# Patient Record
Sex: FEMALE | Race: WHITE | Employment: FULL TIME | URBAN - NONMETROPOLITAN AREA
[De-identification: names, ages, dates, MRNs, and addresses within clinical notes are randomized per-mention and may not be internally consistent; named-entity substitution may affect disease eponyms.]

---

## 2018-01-22 ENCOUNTER — HOSPITAL ENCOUNTER (OUTPATIENT)
Dept: GENERAL RADIOLOGY | Age: 38
Discharge: HOME | End: 2018-01-22
Payer: COMMERCIAL

## 2018-01-22 ENCOUNTER — HOSPITAL ENCOUNTER (OUTPATIENT)
Age: 38
Setting detail: SPECIMEN
Discharge: HOME | End: 2018-01-22
Payer: COMMERCIAL

## 2018-01-22 ENCOUNTER — HOSPITAL ENCOUNTER (OUTPATIENT)
Age: 38
Discharge: HOME | End: 2018-01-22
Payer: COMMERCIAL

## 2018-01-22 ENCOUNTER — OFFICE VISIT (OUTPATIENT)
Dept: PRIMARY CARE CLINIC | Age: 38
End: 2018-01-22
Payer: COMMERCIAL

## 2018-01-22 VITALS
HEIGHT: 62 IN | HEART RATE: 81 BPM | DIASTOLIC BLOOD PRESSURE: 88 MMHG | OXYGEN SATURATION: 98 % | BODY MASS INDEX: 34.19 KG/M2 | WEIGHT: 185.8 LBS | RESPIRATION RATE: 18 BRPM | SYSTOLIC BLOOD PRESSURE: 130 MMHG | TEMPERATURE: 99.8 F

## 2018-01-22 DIAGNOSIS — M70.50 BURSITIS OF KNEE, UNSPECIFIED BURSA, UNSPECIFIED LATERALITY: ICD-10-CM

## 2018-01-22 DIAGNOSIS — M25.562 PAIN AND SWELLING OF LEFT KNEE: Primary | ICD-10-CM

## 2018-01-22 DIAGNOSIS — R50.9 FEVER, UNSPECIFIED FEVER CAUSE: ICD-10-CM

## 2018-01-22 DIAGNOSIS — M25.462 PAIN AND SWELLING OF LEFT KNEE: ICD-10-CM

## 2018-01-22 DIAGNOSIS — M25.462 PAIN AND SWELLING OF LEFT KNEE: Primary | ICD-10-CM

## 2018-01-22 DIAGNOSIS — M25.562 PAIN AND SWELLING OF LEFT KNEE: ICD-10-CM

## 2018-01-22 LAB
ABSOLUTE EOS #: 0.3 K/UL (ref 0–0.4)
ABSOLUTE IMMATURE GRANULOCYTE: NORMAL K/UL (ref 0–0.3)
ABSOLUTE LYMPH #: 3.2 K/UL (ref 1–4.8)
ABSOLUTE MONO #: 0.7 K/UL (ref 0.1–1.2)
BASOPHILS # BLD: 1 % (ref 0–1)
BASOPHILS ABSOLUTE: 0.1 K/UL (ref 0–0.2)
DIFFERENTIAL TYPE: NORMAL
EOSINOPHILS RELATIVE PERCENT: 3 % (ref 1–7)
HCT VFR BLD CALC: 39.8 % (ref 36–46)
HEMOGLOBIN: 13.6 G/DL (ref 12–16)
IMMATURE GRANULOCYTES: NORMAL %
LYMPHOCYTES # BLD: 36 % (ref 16–46)
MCH RBC QN AUTO: 29.6 PG (ref 26–34)
MCHC RBC AUTO-ENTMCNC: 34.2 G/DL (ref 31–37)
MCV RBC AUTO: 86.5 FL (ref 80–100)
MONOCYTES # BLD: 7 % (ref 4–11)
NRBC AUTOMATED: NORMAL PER 100 WBC
PDW BLD-RTO: 14.3 % (ref 11–14.5)
PLATELET # BLD: 235 K/UL (ref 140–450)
PLATELET ESTIMATE: NORMAL
PMV BLD AUTO: 9.8 FL (ref 6–12)
RBC # BLD: 4.6 M/UL (ref 4–5.2)
RBC # BLD: NORMAL 10*6/UL
SEDIMENTATION RATE, ERYTHROCYTE: 5 MM (ref 0–20)
SEG NEUTROPHILS: 53 % (ref 43–77)
SEGMENTED NEUTROPHILS ABSOLUTE COUNT: 4.7 K/UL (ref 1.8–7.7)
URIC ACID: 5.3 MG/DL (ref 2.4–5.7)
WBC # BLD: 8.9 K/UL (ref 3.5–11)
WBC # BLD: NORMAL 10*3/UL

## 2018-01-22 PROCEDURE — 73562 X-RAY EXAM OF KNEE 3: CPT

## 2018-01-22 PROCEDURE — 85025 COMPLETE CBC W/AUTO DIFF WBC: CPT

## 2018-01-22 PROCEDURE — 4004F PT TOBACCO SCREEN RCVD TLK: CPT | Performed by: NURSE PRACTITIONER

## 2018-01-22 PROCEDURE — 99203 OFFICE O/P NEW LOW 30 MIN: CPT | Performed by: NURSE PRACTITIONER

## 2018-01-22 PROCEDURE — G8417 CALC BMI ABV UP PARAM F/U: HCPCS | Performed by: NURSE PRACTITIONER

## 2018-01-22 PROCEDURE — 36415 COLL VENOUS BLD VENIPUNCTURE: CPT

## 2018-01-22 PROCEDURE — A6449 LT COMPRES BAND >=3" <5"/YD: HCPCS | Performed by: NURSE PRACTITIONER

## 2018-01-22 PROCEDURE — 85651 RBC SED RATE NONAUTOMATED: CPT

## 2018-01-22 PROCEDURE — 84550 ASSAY OF BLOOD/URIC ACID: CPT

## 2018-01-22 PROCEDURE — G8427 DOCREV CUR MEDS BY ELIG CLIN: HCPCS | Performed by: NURSE PRACTITIONER

## 2018-01-22 PROCEDURE — G8484 FLU IMMUNIZE NO ADMIN: HCPCS | Performed by: NURSE PRACTITIONER

## 2018-01-22 RX ORDER — BUPROPION HYDROCHLORIDE 150 MG/1
TABLET ORAL
COMMUNITY
Start: 2017-12-16 | End: 2019-07-02

## 2018-01-22 RX ORDER — FERROUS SULFATE 325(65) MG
TABLET ORAL
COMMUNITY
Start: 2017-12-16 | End: 2019-07-02

## 2018-01-22 RX ORDER — PREDNISONE 20 MG/1
TABLET ORAL
Qty: 15 TABLET | Refills: 0 | Status: SHIPPED | OUTPATIENT
Start: 2018-01-22 | End: 2019-07-02 | Stop reason: ALTCHOICE

## 2018-01-22 RX ORDER — LEVOTHYROXINE SODIUM 0.1 MG/1
TABLET ORAL
COMMUNITY
Start: 2018-01-20 | End: 2019-07-02

## 2018-01-22 ASSESSMENT — ENCOUNTER SYMPTOMS: RESPIRATORY NEGATIVE: 1

## 2018-01-22 NOTE — PROGRESS NOTES
of left knee TECHNOLOGIST PROVIDED HISTORY: Reason for exam:->Bilateral left knee pain to medial and lateral aspect. Prominent swelling noted. No known injury. Limited ROM. Pain intensifies with standing. Ordering Physician Provided Reason for Exam: Pain anterior knee for 1 week, no known injury, swelling Acuity: Acute Type of Exam: Initial FINDINGS: No evidence of acute fracture or dislocation. No focal osseous lesion. Slight lateral subluxation of the patella with respect to the femur. Mild to moderate joint effusion. No focal soft tissue abnormality. 1. No acute osseous abnormality. 2. Mild to moderate joint effusion. 3. Slight lateral subluxation of the patella with respect to the femur, please correlate for symptoms of patellar instability.      Hospital Outpatient Visit on 01/22/2018   Component Date Value Ref Range Status    WBC 01/22/2018 8.9  3.5 - 11.0 k/uL Final    RBC 01/22/2018 4.60  4.0 - 5.2 m/uL Final    Hemoglobin 01/22/2018 13.6  12.0 - 16.0 g/dL Final    Hematocrit 01/22/2018 39.8  36 - 46 % Final    MCV 01/22/2018 86.5  80 - 100 fL Final    MCH 01/22/2018 29.6  26 - 34 pg Final    MCHC 01/22/2018 34.2  31 - 37 g/dL Final    RDW 01/22/2018 14.3  11.0 - 14.5 % Final    Platelets 00/87/3334 235  140 - 450 k/uL Final    MPV 01/22/2018 9.8  6.0 - 12.0 fL Final    NRBC Automated 01/22/2018 NOT REPORTED  per 100 WBC Final    Differential Type 01/22/2018 NOT REPORTED   Final    Immature Granulocytes 01/22/2018 NOT REPORTED  0 % Final    Absolute Immature Granulocyte 01/22/2018 NOT REPORTED  0.00 - 0.30 k/uL Final    WBC Morphology 01/22/2018 NOT REPORTED   Final    RBC Morphology 01/22/2018 NOT REPORTED   Final    Platelet Estimate 91/25/8682 NOT REPORTED   Final    Seg Neutrophils 01/22/2018 53  43 - 77 % Final    Lymphocytes 01/22/2018 36  16 - 46 % Final    Monocytes 01/22/2018 7  4 - 11 % Final    Eosinophils % 01/22/2018 3  1 - 7 % Final    Basophils 01/22/2018 1  0 - 1 and then daily x 5 days. 15 tablet 0     No current facility-administered medications for this visit.

## 2018-01-23 NOTE — PATIENT INSTRUCTIONS
you can put on your leg. Use a cane, crutches, or a walker as instructed. · Follow your doctor's instructions about activity during your healing process. If you can do mild exercise, slowly increase your activity. · Reach and stay at a healthy weight. Extra weight can strain the joints, especially the knees and hips, and make the pain worse. Losing even a few pounds may help. When should you call for help? Call 911 anytime you think you may need emergency care. For example, call if:  ? · You have symptoms of a blood clot in your lung (called a pulmonary embolism). These may include:  ¨ Sudden chest pain. ¨ Trouble breathing. ¨ Coughing up blood. ?Call your doctor now or seek immediate medical care if:  ? · You have severe or increasing pain. ? · Your leg or foot turns cold or changes color. ? · You cannot stand or put weight on your knee. ? · Your knee looks twisted or bent out of shape. ? · You cannot move your knee. ? · You have signs of infection, such as:  ¨ Increased pain, swelling, warmth, or redness. ¨ Red streaks leading from the knee. ¨ Pus draining from a place on your knee. ¨ A fever. ? · You have signs of a blood clot in your leg (called a deep vein thrombosis), such as:  ¨ Pain in your calf, back of the knee, thigh, or groin. ¨ Redness and swelling in your leg or groin. ? Watch closely for changes in your health, and be sure to contact your doctor if:  ? · You have tingling, weakness, or numbness in your knee. ? · You have any new symptoms, such as swelling. ? · You have bruises from a knee injury that last longer than 2 weeks. ? · You do not get better as expected. Where can you learn more? Go to https://[employer]. org and sign in to your [employer] account. Enter K195 in the Bag of Ice box to learn more about \"Knee Pain or Injury: Care Instructions. \"     If you do not have an account, please click on the \"Sign Up Now\" link.   Current as of: prescription medicine for pain, take it as prescribed. ¨ If you are not taking a prescription pain medicine, ask your doctor if you can take an over-the-counter medicine. · Eat less seafood and red meat. · Check with your doctor before drinking alcohol. · Losing weight, if you are overweight, may help reduce attacks of gout. But do not go on a [employer]. \" Losing a lot of weight in a short amount of time can cause a gout attack. When should you call for help? Call your doctor now or seek immediate medical care if:  ? · You have a fever. ? · The joint is so painful you cannot use it. ? · You have sudden, unexplained swelling, redness, warmth, or severe pain in one or more joints. ? Watch closely for changes in your health, and be sure to contact your doctor if:  ? · You have joint pain. ? · Your symptoms get worse or are not improving after 2 or 3 days. Where can you learn more? Go to https://[employer].Friendsee. org and sign in to your [employer] account. Enter M989 in the [employer] box to learn more about \"Gout: Care Instructions. \"     If you do not have an account, please click on the \"Sign Up Now\" link. Current as of: October 31, 2016  Content Version: 11.5  © 9163-2660 [employer]. Care instructions adapted under license by [facility] ([facility]). If you have questions about a medical condition or this instruction, always ask your healthcare professional. [place] [address] any warranty or liability for your use of this information. Patient Education        Septic Arthritis: Care Instructions  Your Care Instructions    Septic arthritis is a bacterial infection in a joint. This occurs when an infection from another part of the body, such as pneumonia or a skin or kidney infection, travels through the bloodstream to the joint. It may also spread to the joint from an infection in nearby soft tissue, or it can follow a surgery or injury.  The joint is instructions adapted under license by [facility] ([facility]). If you have questions about a medical condition or this instruction, always ask your healthcare professional. [address] any warranty or liability for your use of this information.

## 2019-03-25 ENCOUNTER — OFFICE VISIT (OUTPATIENT)
Dept: PRIMARY CARE CLINIC | Age: 39
End: 2019-03-25
Payer: COMMERCIAL

## 2019-03-25 VITALS
RESPIRATION RATE: 16 BRPM | DIASTOLIC BLOOD PRESSURE: 80 MMHG | BODY MASS INDEX: 33.75 KG/M2 | WEIGHT: 183.4 LBS | OXYGEN SATURATION: 98 % | TEMPERATURE: 99.4 F | HEART RATE: 75 BPM | HEIGHT: 62 IN | SYSTOLIC BLOOD PRESSURE: 130 MMHG

## 2019-03-25 DIAGNOSIS — S46.811A STRAIN OF RIGHT TRAPEZIUS MUSCLE, INITIAL ENCOUNTER: Primary | ICD-10-CM

## 2019-03-25 PROCEDURE — G8484 FLU IMMUNIZE NO ADMIN: HCPCS | Performed by: NURSE PRACTITIONER

## 2019-03-25 PROCEDURE — G8427 DOCREV CUR MEDS BY ELIG CLIN: HCPCS | Performed by: NURSE PRACTITIONER

## 2019-03-25 PROCEDURE — 99213 OFFICE O/P EST LOW 20 MIN: CPT | Performed by: NURSE PRACTITIONER

## 2019-03-25 PROCEDURE — G8417 CALC BMI ABV UP PARAM F/U: HCPCS | Performed by: NURSE PRACTITIONER

## 2019-03-25 PROCEDURE — 4004F PT TOBACCO SCREEN RCVD TLK: CPT | Performed by: NURSE PRACTITIONER

## 2019-03-25 RX ORDER — CYCLOBENZAPRINE HCL 10 MG
10 TABLET ORAL NIGHTLY PRN
Qty: 30 TABLET | Refills: 0 | Status: SHIPPED | OUTPATIENT
Start: 2019-03-25 | End: 2019-04-04

## 2019-03-25 RX ORDER — PREDNISONE 10 MG/1
TABLET ORAL
Qty: 32 TABLET | Refills: 0 | Status: SHIPPED | OUTPATIENT
Start: 2019-03-25 | End: 2019-07-02

## 2019-03-25 ASSESSMENT — PATIENT HEALTH QUESTIONNAIRE - PHQ9
2. FEELING DOWN, DEPRESSED OR HOPELESS: 0
SUM OF ALL RESPONSES TO PHQ QUESTIONS 1-9: 0
1. LITTLE INTEREST OR PLEASURE IN DOING THINGS: 0
SUM OF ALL RESPONSES TO PHQ QUESTIONS 1-9: 0
SUM OF ALL RESPONSES TO PHQ9 QUESTIONS 1 & 2: 0

## 2019-03-25 ASSESSMENT — ENCOUNTER SYMPTOMS
TROUBLE SWALLOWING: 0
WHEEZING: 0
COUGH: 0

## 2019-07-02 ENCOUNTER — OFFICE VISIT (OUTPATIENT)
Dept: FAMILY MEDICINE CLINIC | Age: 39
End: 2019-07-02
Payer: COMMERCIAL

## 2019-07-02 VITALS
SYSTOLIC BLOOD PRESSURE: 132 MMHG | HEART RATE: 64 BPM | OXYGEN SATURATION: 97 % | BODY MASS INDEX: 32.12 KG/M2 | WEIGHT: 181.3 LBS | DIASTOLIC BLOOD PRESSURE: 86 MMHG | HEIGHT: 63 IN

## 2019-07-02 DIAGNOSIS — R53.83 FATIGUE, UNSPECIFIED TYPE: ICD-10-CM

## 2019-07-02 DIAGNOSIS — E03.9 HYPOTHYROIDISM, UNSPECIFIED TYPE: ICD-10-CM

## 2019-07-02 DIAGNOSIS — F41.9 ANXIETY: ICD-10-CM

## 2019-07-02 DIAGNOSIS — Z23 NEED FOR PNEUMOCOCCAL VACCINATION: ICD-10-CM

## 2019-07-02 DIAGNOSIS — Z13.220 SCREENING FOR HYPERLIPIDEMIA: ICD-10-CM

## 2019-07-02 DIAGNOSIS — Z23 NEED FOR TDAP VACCINATION: ICD-10-CM

## 2019-07-02 DIAGNOSIS — Z76.89 ENCOUNTER TO ESTABLISH CARE: Primary | ICD-10-CM

## 2019-07-02 DIAGNOSIS — J30.2 SEASONAL ALLERGIC RHINITIS, UNSPECIFIED TRIGGER: ICD-10-CM

## 2019-07-02 DIAGNOSIS — Z86.2 HX OF IRON DEFICIENCY ANEMIA: ICD-10-CM

## 2019-07-02 PROCEDURE — 4004F PT TOBACCO SCREEN RCVD TLK: CPT | Performed by: NURSE PRACTITIONER

## 2019-07-02 PROCEDURE — 90471 IMMUNIZATION ADMIN: CPT | Performed by: NURSE PRACTITIONER

## 2019-07-02 PROCEDURE — 99204 OFFICE O/P NEW MOD 45 MIN: CPT | Performed by: NURSE PRACTITIONER

## 2019-07-02 PROCEDURE — 90732 PPSV23 VACC 2 YRS+ SUBQ/IM: CPT | Performed by: NURSE PRACTITIONER

## 2019-07-02 PROCEDURE — 90715 TDAP VACCINE 7 YRS/> IM: CPT | Performed by: NURSE PRACTITIONER

## 2019-07-02 PROCEDURE — 90472 IMMUNIZATION ADMIN EACH ADD: CPT | Performed by: NURSE PRACTITIONER

## 2019-07-02 PROCEDURE — G8427 DOCREV CUR MEDS BY ELIG CLIN: HCPCS | Performed by: NURSE PRACTITIONER

## 2019-07-02 PROCEDURE — G8417 CALC BMI ABV UP PARAM F/U: HCPCS | Performed by: NURSE PRACTITIONER

## 2019-07-02 RX ORDER — LORATADINE 10 MG/1
10 TABLET ORAL DAILY
Qty: 30 TABLET | Refills: 5 | Status: SHIPPED | OUTPATIENT
Start: 2019-07-02

## 2019-07-02 RX ORDER — EUCALYPTUS/PEPPERMINT OIL
1 SOLUTION, NON-ORAL NASAL 2 TIMES DAILY
Qty: 1 BOTTLE | COMMUNITY
Start: 2019-07-02

## 2019-07-02 ASSESSMENT — ENCOUNTER SYMPTOMS
COUGH: 0
WHEEZING: 0
SORE THROAT: 1
SINUS PRESSURE: 1
ABDOMINAL PAIN: 0
DIARRHEA: 0
RHINORRHEA: 1
EYE ITCHING: 1
SHORTNESS OF BREATH: 0
CONSTIPATION: 0

## 2019-07-02 NOTE — PROGRESS NOTES
trigger    6. Need for Tdap vaccination    7. Hx of iron deficiency anemia    8. Screening for hyperlipidemia    9. Need for pneumococcal vaccination        PLAN:    Orders Placed This Encounter   Procedures    Tdap (age 6y and older) IM (Boostrix)    Pneumococcal polysaccharide vaccine 23-valent greater than or equal to 3yo subcutaneous/IM    TSH without Reflex     Standing Status:   Future     Standing Expiration Date:   7/2/2020    CBC Auto Differential     Standing Status:   Future     Standing Expiration Date:   8/31/2019    Comprehensive Metabolic Panel     Standing Status:   Future     Standing Expiration Date:   7/1/2020    Lipid, Fasting     Standing Status:   Future     Standing Expiration Date:   7/1/2020    CBC Auto Differential    Lipid Panel    Comprehensive Metabolic Panel    TSH without Reflex     Went to counseling years ago, does not feel she needs additional help with anxiety at this time. Patient given educational materials - see patient instructions. Encouraged monthly self breast exams, healthy diet and routine exercise. Instructed to continue current medications. All patient questions answered. Pt voiced understanding. Health Maintenance reviewed- patient asked to schedule her pap smear. Patient agreed with treatment plan. Follow up as directed. Return in about 1 month (around 7/30/2019) for Pap.     Electronically signed by JOCELINE Mota CNP on 7/7/2019 at 4:05 PM.

## 2019-07-03 LAB
A/G RATIO: 1.8 RATIO
AGE FOR GFR: 37
ALBUMIN: 4.9 G/DL (ref 3.5–5)
ALK PHOSPHATASE: 38 UNITS/L (ref 38–126)
ALT SERPL-CCNC: 192 UNITS/L (ref 9–52)
ANION GAP SERPL CALCULATED.3IONS-SCNC: 15 MMOL/L
AST SERPL-CCNC: 152 UNITS/L (ref 14–36)
BASOPHILS # BLD: 0.14 THOU/MM3 (ref 0–0.3)
BILIRUB SERPL-MCNC: 0.7 MG/DL (ref 0.2–1.3)
BUN BLDV-MCNC: 9 MG/DL (ref 7–17)
CALCIUM SERPL-MCNC: 9.6 MG/DL (ref 8.4–10.2)
CHLORIDE BLD-SCNC: 99 MMOL/L (ref 98–120)
CHOLESTEROL/HDL RATIO: 5.9 RATIO (ref 0–4.5)
CHOLESTEROL: 206 MG/DL (ref 50–200)
CO2: 28 MMOL/L (ref 22–31)
CREAT SERPL-MCNC: 0.6 MG/DL (ref 0.5–1)
DIFFERENTIAL: AUTOMATED DIFF
EGFR BF: 136 ML/MIN/1.73 M2
EGFR BM: 184 ML/MIN/1.73 M2
EGFR WF: 112 ML/MIN/1.73 M2
EGFR WM: 152 ML/MIN/1.73 M2
EOSINOPHIL # BLD: 0.22 THOU/MM3 (ref 0–1.1)
GLOBULIN: 2.8 G/DL
GLUCOSE: 93 MG/DL (ref 65–105)
HCT VFR BLD CALC: 46 % (ref 37–47)
HDL, DIRECT: 35 MG/DL (ref 36–68)
HEMOGLOBIN: 15.1 G/DL (ref 12–16)
LDL CHOLESTEROL CALCULATED: 119.4 MG/DL (ref 0–160)
LYMPHOCYTES # BLD: 3.61 THOU/MM3 (ref 1–5.5)
MCH RBC QN AUTO: 28.9 PG (ref 28.5–32)
MCHC RBC AUTO-ENTMCNC: 32.8 G/DL (ref 32–37)
MCV RBC AUTO: 88 FL (ref 80–94)
MONOCYTES # BLD: 0.67 THOU/MM3 (ref 0.1–1)
NEUTROPHILS: 4.48 THOU/MM3 (ref 2–8.1)
PDW BLD-RTO: 11.6 % (ref 8.5–15.5)
PLATELET # BLD: 243 THOU/MM3 (ref 130–400)
PMV BLD AUTO: 10.7 FL (ref 7.4–11)
POTASSIUM SERPL-SCNC: 4.4 MMOL/L (ref 3.6–5)
RBC # BLD: 5.23 M/UL (ref 4.2–5.4)
SODIUM BLD-SCNC: 138 MMOL/L (ref 135–145)
TOTAL PROTEIN: 7.7 G/DL (ref 6.3–8.2)
TRIGL SERPL-MCNC: 258 MG/DL (ref 10–250)
TSH SERPL DL<=0.05 MIU/L-ACNC: 11.8 MIU/ML (ref 0.49–4.6)
VLDLC SERPL CALC-MCNC: 52 MG/DL (ref 0–40)
WBC # BLD: 9.12 THOU/ML3 (ref 4.8–10)

## 2019-07-09 DIAGNOSIS — E03.9 HYPOTHYROIDISM, UNSPECIFIED TYPE: Primary | ICD-10-CM

## 2019-07-09 DIAGNOSIS — R74.8 ELEVATED LIVER ENZYMES: ICD-10-CM

## 2019-07-09 RX ORDER — LEVOTHYROXINE SODIUM 0.1 MG/1
100 TABLET ORAL DAILY
Qty: 30 TABLET | Refills: 2 | Status: SHIPPED | OUTPATIENT
Start: 2019-07-09 | End: 2021-07-21

## 2019-07-17 LAB
A/G RATIO: 1.6 RATIO
ALBUMIN: 4.8 G/DL (ref 3.5–5)
ALK PHOSPHATASE: 37 UNITS/L (ref 38–126)
ALT SERPL-CCNC: 178 UNITS/L (ref 9–52)
AST SERPL-CCNC: 135 UNITS/L (ref 14–36)
BILIRUB SERPL-MCNC: 0.8 MG/DL (ref 0.2–1.3)
BILIRUBIN DIRECT: 0 MG/DL (ref 0–0.3)
GLOBULIN: 3 G/DL
TOTAL PROTEIN: 7.8 G/DL (ref 6.3–8.2)

## 2019-07-19 DIAGNOSIS — R74.8 ELEVATED LIVER ENZYMES: Primary | ICD-10-CM

## 2019-07-26 LAB
HAV AB SERPL IA-ACNC: NORMAL
HEPATITIS B CORE TOTAL ANTIBODY: NORMAL
HEPATITIS B SURFACE ANTIGEN: NORMAL
HEPATITIS C IGG: NORMAL
Lab: NORMAL
SIGNAL/CUTOFF: NORMAL

## 2019-07-29 ENCOUNTER — TELEPHONE (OUTPATIENT)
Dept: FAMILY MEDICINE CLINIC | Age: 39
End: 2019-07-29

## 2019-07-29 DIAGNOSIS — R74.8 ELEVATED LIVER ENZYMES: ICD-10-CM

## 2019-07-31 ENCOUNTER — TELEPHONE (OUTPATIENT)
Dept: FAMILY MEDICINE CLINIC | Age: 39
End: 2019-07-31

## 2020-03-25 ENCOUNTER — APPOINTMENT (OUTPATIENT)
Dept: GENERAL RADIOLOGY | Age: 40
End: 2020-03-25
Payer: COMMERCIAL

## 2020-03-25 ENCOUNTER — HOSPITAL ENCOUNTER (EMERGENCY)
Age: 40
Discharge: HOME | End: 2020-03-25
Attending: EMERGENCY MEDICINE
Payer: COMMERCIAL

## 2020-03-25 VITALS
HEART RATE: 66 BPM | DIASTOLIC BLOOD PRESSURE: 97 MMHG | OXYGEN SATURATION: 100 % | WEIGHT: 180 LBS | SYSTOLIC BLOOD PRESSURE: 173 MMHG | BODY MASS INDEX: 33.13 KG/M2 | RESPIRATION RATE: 18 BRPM | TEMPERATURE: 99.3 F | HEIGHT: 62 IN

## 2020-03-25 LAB
-: ABNORMAL
AMORPHOUS: ABNORMAL
BACTERIA: ABNORMAL
BILIRUBIN URINE: NEGATIVE
CASTS UA: ABNORMAL /LPF (ref 0–2)
COLOR: ABNORMAL
COMMENT UA: ABNORMAL
CRYSTALS, UA: ABNORMAL /HPF
D DIMER: <100 NG/ML
DIRECT EXAM: NORMAL
EPITHELIAL CELLS UA: ABNORMAL /HPF (ref 0–5)
GLUCOSE URINE: NEGATIVE
KETONES, URINE: NEGATIVE
LEUKOCYTE ESTERASE, URINE: NEGATIVE
Lab: NORMAL
MUCUS: ABNORMAL
NITRITE, URINE: NEGATIVE
OTHER OBSERVATIONS UA: ABNORMAL
PH UA: 7 (ref 5–6)
PROTEIN UA: ABNORMAL
RBC UA: ABNORMAL /HPF (ref 0–4)
RENAL EPITHELIAL, UA: ABNORMAL /HPF
SPECIFIC GRAVITY UA: 1.01 (ref 1.01–1.02)
SPECIMEN DESCRIPTION: NORMAL
TRICHOMONAS: ABNORMAL
TURBIDITY: ABNORMAL
URINE HGB: ABNORMAL
UROBILINOGEN, URINE: NORMAL
WBC UA: ABNORMAL /HPF (ref 0–4)
YEAST: ABNORMAL

## 2020-03-25 PROCEDURE — 85379 FIBRIN DEGRADATION QUANT: CPT

## 2020-03-25 PROCEDURE — 87804 INFLUENZA ASSAY W/OPTIC: CPT

## 2020-03-25 PROCEDURE — 71046 X-RAY EXAM CHEST 2 VIEWS: CPT

## 2020-03-25 PROCEDURE — 81001 URINALYSIS AUTO W/SCOPE: CPT

## 2020-03-25 PROCEDURE — 99283 EMERGENCY DEPT VISIT LOW MDM: CPT

## 2020-03-25 ASSESSMENT — PAIN DESCRIPTION - ORIENTATION: ORIENTATION: POSTERIOR

## 2020-03-25 ASSESSMENT — PAIN DESCRIPTION - LOCATION: LOCATION: CHEST

## 2020-03-25 ASSESSMENT — ENCOUNTER SYMPTOMS
ABDOMINAL PAIN: 0
CONSTIPATION: 0
EYE PAIN: 0
SHORTNESS OF BREATH: 0
NAUSEA: 0
COUGH: 1
VOMITING: 0
BLOOD IN STOOL: 0
DIARRHEA: 0
BACK PAIN: 1

## 2020-03-25 ASSESSMENT — PAIN SCALES - GENERAL
PAINLEVEL_OUTOF10: 3
PAINLEVEL_OUTOF10: 5

## 2020-03-25 ASSESSMENT — PAIN DESCRIPTION - PAIN TYPE: TYPE: ACUTE PAIN

## 2020-03-25 NOTE — ED TRIAGE NOTES
Patient to ED with chest pain to posterior back. Had low grade . Patient states occasional cough. patient aslo reports feels tired for 4-5 dayst

## 2020-03-25 NOTE — ED PROVIDER NOTES
[facility]  eMERGENCY dEPARTMENT eNCOUnter      Pt Name: Sienna Castnaeda  MRN: [id number]  [place] 1980  Date of evaluation: 3/25/2020      CHIEF COMPLAINT       Chief Complaint   Patient presents with    Fever     low grade    Pleurisy     ? posterior chest         HISTORY OF PRESENT ILLNESS    Sienna Castaneda is a 42 y.o. female who presents chief complaint of a little cough low-grade fever. For several days she says her back hurts a little bit in the back thoracic region fevers been as high as 100.4 she had a runny nose little bit of congestion there is been no nausea vomiting no shortness of breath no chest pain she has noticed increased urinary frequency. There is been no recent travel no sick exposures no leg swelling her last period was 2 years ago she is had both a tubal ligation and a uterine ablation      REVIEW OF SYSTEMS         Review of Systems   Constitutional: Positive for fatigue and fever. Negative for chills. HENT: Positive for congestion. Negative for ear pain. Eyes: Negative for pain and visual disturbance. Respiratory: Positive for cough. Negative for shortness of breath. Cardiovascular: Negative for chest pain, palpitations and leg swelling. Gastrointestinal: Negative for abdominal pain, blood in stool, constipation, diarrhea, nausea and vomiting. Endocrine: Negative for polydipsia and polyuria. Genitourinary: Positive for frequency. Negative for difficulty urinating, dysuria, vaginal bleeding and vaginal discharge. Musculoskeletal: Positive for back pain. Negative for joint swelling, myalgias, neck pain and neck stiffness. Skin: Negative for rash. Neurological: Negative for dizziness, weakness and headaches. Hematological: Negative for adenopathy. Does not bruise/bleed easily. Psychiatric/Behavioral: Negative for confusion, self-injury and suicidal ideas.          PAST MEDICAL HISTORY    has a past medical history of Anemia, iron deficiency, Anxiety, Depression, Hypothyroid, and Meniere disease. SURGICAL HISTORY      has a past surgical history that includes Tubal ligation; Dilation and curettage of uterus (2018); and Breast lumpectomy (25 years ago). CURRENT MEDICATIONS       Previous Medications    LEVOTHYROXINE (SYNTHROID) 100 MCG TABLET    Take 1 tablet by mouth Daily    LORATADINE (CLARITIN) 10 MG TABLET    Take 1 tablet by mouth daily    MISC NATURAL PRODUCT NASAL (PONARIS) SOLN    1 spray by Nasal route 2 times daily       ALLERGIES     has No Known Allergies. FAMILY HISTORY     She indicated that her mother is alive. She indicated that her father is alive. She indicated that her half-brother is alive. She indicated that all of her three half-sisters are alive. family history includes Anemia in her half-sister; Asthma in her father; Cancer in her mother; Depression in her half-brother and half-sister; Diabetes in her father; High Blood Pressure in her father, half-brother, half-sister, and mother; Mental Illness in her half-sister and half-sister; Other in her father; Sleep Apnea in her father. SOCIAL HISTORY      reports that she has been smoking. She started smoking about 20 years ago. She has been smoking about 0.50 packs per day. She has never used smokeless tobacco. She reports that she does not drink alcohol or use drugs. PHYSICAL EXAM     INITIAL VITALS:  height is 5' 2\" (1.575 m) and weight is 180 lb (81.6 kg). Her tympanic temperature is 99.3 °F (37.4 °C). Her blood pressure is 174/95 (abnormal) and her pulse is 80. Her respiration is 18 and oxygen saturation is 100%. Physical Exam  Constitutional:       Appearance: Normal appearance. She is well-developed. She is not toxic-appearing or diaphoretic. HENT:      Head: Normocephalic and atraumatic. Mouth/Throat:      Mouth: Mucous membranes are moist.   Eyes:      Conjunctiva/sclera: Conjunctivae normal.      Pupils: Pupils are equal, round, and reactive to light. Neck:      Musculoskeletal: Normal range of motion. Cardiovascular:      Rate and Rhythm: Normal rate and regular rhythm. Pulmonary:      Effort: Pulmonary effort is normal. No respiratory distress. Breath sounds: Normal breath sounds. No stridor. No wheezing, rhonchi or rales. Chest:      Chest wall: No tenderness. Abdominal:      General: Bowel sounds are normal.      Palpations: Abdomen is soft. Musculoskeletal: Normal range of motion. General: No tenderness. Skin:     General: Skin is warm and dry. Capillary Refill: Capillary refill takes less than 2 seconds. Neurological:      Mental Status: She is alert and oriented to person, place, and time. Psychiatric:         Behavior: Behavior normal.           DIFFERENTIAL DIAGNOSIS/ MDM:     Cough with a bit of back pain and URI symptoms will rule out pneumonia influenza also check a d-dimer  With the frequency will check a urinalysis  DIAGNOSTIC RESULTS     EKG: All EKG's are interpreted by the Emergency Department Physician who either signs or Co-signs this chart in the absence of a cardiologist.        RADIOLOGY:   I directly visualized the following  images and reviewed the radiologist interpretations:       TWO XRAY VIEWS OF THE CHEST       3/25/2020 12:05 pm       COMPARISON:   None.       HISTORY:   ORDERING SYSTEM PROVIDED HISTORY: shortness of breath   TECHNOLOGIST PROVIDED HISTORY:   shortness of breath   Reason for Exam: Chest pain, mid back pain, low grade temp, slight cough;   *mask and face shield worn*       FINDINGS:   Shallow inflation.  The cardiomediastinal silhouette is within normal limits.    Basilar linear opacities are noted without focal area of consolidation.  No   pneumothorax, edema or effusion.  No evidence for effusion.  No acute osseous   abnormality is identified.           Impression   Shallow inflation with basilar linear opacities favoring atelectasis.               ED BEDSIDE ULTRASOUND: LABS:  Labs Reviewed   URINE RT REFLEX TO CULTURE - Abnormal; Notable for the following components:       Result Value    Urine Hgb TRACE (*)     pH, UA 7.0 (*)     Protein, UA 1+ (*)     All other components within normal limits   MICROSCOPIC URINALYSIS - Abnormal; Notable for the following components:    Bacteria, UA 2+ (*)     All other components within normal limits   RAPID INFLUENZA A/B ANTIGENS   D-DIMER, RAPID           EMERGENCY DEPARTMENT COURSE:   Vitals:    Vitals:    03/25/20 1056   BP: (!) 174/95   Pulse: 80   Resp: 18   Temp: 99.3 °F (37.4 °C)   TempSrc: Tympanic   SpO2: 100%   Weight: 180 lb (81.6 kg)   Height: 5' 2\" (1.575 m)     -------------------------  BP: (!) 174/95, Temp: 99.3 °F (37.4 °C), Pulse: 80, Resp: 18        Re-evaluation Notes    Resting comfortably    CRITICAL CARE:   None        CONSULTS:      PROCEDURES:  None    FINAL IMPRESSION      1. Cough          DISPOSITION/PLAN   DISPOSITION discharged    Condition on Disposition    Stable    PATIENT REFERRED TO:  Angela Diez, JOCELINE - CNP  [address] [facility]  [phone]    In 3 days        DISCHARGE MEDICATIONS:  New Prescriptions    No medications on file       (Please note that portions of this note were completed with a voice recognition program.  Efforts were made to edit the dictations but occasionally words are mis-transcribed.)    Valentino Anton,, MD, F.A.A.E.M.   Attending Emergency Physician                          Valentino Anton, MD  03/25/20 2917

## 2020-12-15 ENCOUNTER — OFFICE VISIT (OUTPATIENT)
Dept: FAMILY MEDICINE CLINIC | Age: 40
End: 2020-12-15
Payer: COMMERCIAL

## 2020-12-15 VITALS
WEIGHT: 176 LBS | DIASTOLIC BLOOD PRESSURE: 94 MMHG | OXYGEN SATURATION: 99 % | BODY MASS INDEX: 32.19 KG/M2 | SYSTOLIC BLOOD PRESSURE: 142 MMHG | HEART RATE: 73 BPM

## 2020-12-15 PROCEDURE — 99214 OFFICE O/P EST MOD 30 MIN: CPT | Performed by: INTERNAL MEDICINE

## 2020-12-15 NOTE — PROGRESS NOTES
[address]  [address]  Dept: [phone]  Dept Fax: [phone]: [phone]     Visit Date:  12/15/2020    Patient:  Keerthi Peters  YOB: 1980    HPI:   Keerthi Peters presents today for   Chief Complaint   Patient presents with    Hypertension     patient is here for high bp. she was going to have a tooth pulled and they told her she will have to control the bp before it is pulled. Satnam Covarrubias HPI 82-year-old female is coming in today to check on her blood pressure. Patient reports she was seen in her dentist and was told that she has high blood pressure and she was recommended to go see her primary care doctor for better control before tooth extraction surgery. Today her blood pressure was 142/94 and per patient at dentist office it was  150s to 160s and the bottom number was above 100. She has no cardiopulmonary related chest complaints today. She does not check her blood pressure at home and is not taking any medications. Medications  Prior to Visit Medications    Medication Sig Taking? Authorizing Provider   levothyroxine (SYNTHROID) 100 MCG tablet Take 1 tablet by mouth Daily  Kasie Rico APRN - CNP   Misc Natural Product Nasal (PONARIS) SOLN 1 spray by Nasal route 2 times daily  Kasie Gómez APRN - CNP   loratadine (CLARITIN) 10 MG tablet Take 1 tablet by mouth daily  Kasie Rico APRN - CNP        Allergies:  has No Known Allergies. Past Medical History:   has a past medical history of Anemia, iron deficiency, Anxiety, Depression, Hypothyroid, and Meniere disease. Past Surgical History   has a past surgical history that includes Tubal ligation; Dilation and curettage of uterus (2018); and Breast lumpectomy (25 years ago).     Family History family history includes Anemia in her half-sister; Asthma in her father; Cancer in her mother; Depression in her half-brother and half-sister; Diabetes in her father; High Blood Pressure in her father, half-brother, half-sister, and mother; Mental Illness in her half-sister and half-sister; Other in her father; Sleep Apnea in her father. Social History   reports that she has been smoking. She started smoking about 20 years ago. She has been smoking about 0.50 packs per day. She has never used smokeless tobacco. She reports that she does not drink alcohol or use drugs. Health Maintenance:    Health Maintenance   Topic Date Due    Varicella vaccine (1 of 2 - 2-dose childhood series) 06/03/1983    HIV screen  06/03/1997    Cervical cancer screen  06/03/2003    Flu vaccine (1) 09/01/2020    DTaP/Tdap/Td vaccine (2 - Td) 07/02/2029    Pneumococcal 0-64 years Vaccine  Completed    Hepatitis A vaccine  Aged Out    Hepatitis B vaccine  Aged Out    Hib vaccine  Aged Out    Meningococcal (ACWY) vaccine  Aged Out       Subjective:      Review of Systems   Constitutional: Negative for fatigue, fever and unexpected weight change. HENT: Negative for ear pain, postnasal drip, rhinorrhea, sinus pain, sore throat and trouble swallowing. Eyes: Negative for visual disturbance. Respiratory: Negative for cough, chest tightness and shortness of breath. Cardiovascular: Negative for chest pain and leg swelling. Gastrointestinal: Negative for abdominal pain, blood in stool and diarrhea. Endocrine: Negative for polyuria. Genitourinary: Negative for difficulty urinating and flank pain. Musculoskeletal: Negative for arthralgias, joint swelling and myalgias. Skin: Negative for rash. Allergic/Immunologic: Negative for environmental allergies. Neurological: Negative for weakness, light-headedness, numbness and headaches. Hematological: Negative for adenopathy. Psychiatric/Behavioral: Negative for behavioral problems and suicidal ideas. The patient is not nervous/anxious. Objective:     BP (!) 142/94 (Site: Right Upper Arm, Position: Sitting)   Pulse 73   Wt 176 lb (79.8 kg)   SpO2 99%   BMI 32.19 kg/m²     Physical Exam  Vitals signs and nursing note reviewed. Constitutional:       Appearance: She is obese. HENT:      Head: Normocephalic and atraumatic. Neck:      Musculoskeletal: Normal range of motion. Cardiovascular:      Rate and Rhythm: Normal rate and regular rhythm. Pulses: Normal pulses. Heart sounds: Normal heart sounds. No murmur. No friction rub. No gallop. Pulmonary:      Effort: Pulmonary effort is normal. No respiratory distress. Breath sounds: Normal breath sounds. No stridor. No wheezing, rhonchi or rales. Chest:      Chest wall: No tenderness. Abdominal:      General: Abdomen is flat. Bowel sounds are normal. There is no distension. Palpations: Abdomen is soft. There is no mass. Tenderness: There is no abdominal tenderness. There is no guarding or rebound. Hernia: No hernia is present. Musculoskeletal: Normal range of motion. Skin:     General: Skin is warm. Neurological:      Mental Status: She is oriented to person, place, and time. Mental status is at baseline. Psychiatric:         Mood and Affect: Mood normal.             Assessment       Diagnosis Orders   1. BP check           PLAN     Although Blood Pressure was elevated today but I did recommended that she should continue to check blood pressure for at least 1 week or so and call us back for persistent numbers above 140/80 or any other cardiopulmonary related symptoms. Lots of things and variables affect blood pressure such as anxiety stress pain inflammation infection and stress. High  Top number of 130 or above and/or bottom number of 80 or above  ? Elevated  Top number between 120 and 129 and bottom number of 79 or below

## 2020-12-18 ASSESSMENT — ENCOUNTER SYMPTOMS
COUGH: 0
DIARRHEA: 0
TROUBLE SWALLOWING: 0
ABDOMINAL PAIN: 0
CHEST TIGHTNESS: 0
RHINORRHEA: 0
BLOOD IN STOOL: 0
SINUS PAIN: 0
SORE THROAT: 0
SHORTNESS OF BREATH: 0

## 2021-04-21 ENCOUNTER — VIRTUAL VISIT (OUTPATIENT)
Dept: FAMILY MEDICINE CLINIC | Age: 41
End: 2021-04-21
Payer: COMMERCIAL

## 2021-04-21 ENCOUNTER — HOSPITAL ENCOUNTER (OUTPATIENT)
Age: 41
Setting detail: SPECIMEN
Discharge: HOME | End: 2021-04-21
Payer: COMMERCIAL

## 2021-04-21 ENCOUNTER — NURSE ONLY (OUTPATIENT)
Dept: FAMILY MEDICINE CLINIC | Age: 41
End: 2021-04-21
Payer: COMMERCIAL

## 2021-04-21 DIAGNOSIS — Z20.822 ENCOUNTER FOR LABORATORY TESTING FOR COVID-19 VIRUS: ICD-10-CM

## 2021-04-21 DIAGNOSIS — R11.0 NAUSEA: ICD-10-CM

## 2021-04-21 DIAGNOSIS — J01.10 ACUTE FRONTAL SINUSITIS, RECURRENCE NOT SPECIFIED: Primary | ICD-10-CM

## 2021-04-21 DIAGNOSIS — J01.10 ACUTE FRONTAL SINUSITIS, RECURRENCE NOT SPECIFIED: ICD-10-CM

## 2021-04-21 DIAGNOSIS — R53.83 FATIGUE, UNSPECIFIED TYPE: ICD-10-CM

## 2021-04-21 DIAGNOSIS — R05.9 COUGH: ICD-10-CM

## 2021-04-21 PROCEDURE — U0005 INFEC AGEN DETEC AMPLI PROBE: HCPCS

## 2021-04-21 PROCEDURE — 99213 OFFICE O/P EST LOW 20 MIN: CPT | Performed by: NURSE PRACTITIONER

## 2021-04-21 PROCEDURE — U0003 INFECTIOUS AGENT DETECTION BY NUCLEIC ACID (DNA OR RNA); SEVERE ACUTE RESPIRATORY SYNDROME CORONAVIRUS 2 (SARS-COV-2) (CORONAVIRUS DISEASE [COVID-19]), AMPLIFIED PROBE TECHNIQUE, MAKING USE OF HIGH THROUGHPUT TECHNOLOGIES AS DESCRIBED BY CMS-2020-01-R: HCPCS

## 2021-04-21 RX ORDER — AZITHROMYCIN 250 MG/1
TABLET, FILM COATED ORAL
Qty: 1 PACKET | Refills: 0 | Status: SHIPPED | OUTPATIENT
Start: 2021-04-21 | End: 2021-04-25

## 2021-04-21 SDOH — ECONOMIC STABILITY: INCOME INSECURITY: HOW HARD IS IT FOR YOU TO PAY FOR THE VERY BASICS LIKE FOOD, HOUSING, MEDICAL CARE, AND HEATING?: NOT HARD AT ALL

## 2021-04-21 SDOH — ECONOMIC STABILITY: TRANSPORTATION INSECURITY
IN THE PAST 12 MONTHS, HAS LACK OF TRANSPORTATION KEPT YOU FROM MEETINGS, WORK, OR FROM GETTING THINGS NEEDED FOR DAILY LIVING?: NO

## 2021-04-21 SDOH — ECONOMIC STABILITY: FOOD INSECURITY: WITHIN THE PAST 12 MONTHS, THE FOOD YOU BOUGHT JUST DIDN'T LAST AND YOU DIDN'T HAVE MONEY TO GET MORE.: NEVER TRUE

## 2021-04-21 SDOH — ECONOMIC STABILITY: FOOD INSECURITY: WITHIN THE PAST 12 MONTHS, YOU WORRIED THAT YOUR FOOD WOULD RUN OUT BEFORE YOU GOT MONEY TO BUY MORE.: NEVER TRUE

## 2021-04-21 ASSESSMENT — ENCOUNTER SYMPTOMS
NAUSEA: 1
DIARRHEA: 0
WHEEZING: 0
ABDOMINAL PAIN: 0
SORE THROAT: 1
COUGH: 1
SINUS PRESSURE: 1
SHORTNESS OF BREATH: 0
RHINORRHEA: 0
VOMITING: 0

## 2021-04-21 ASSESSMENT — PATIENT HEALTH QUESTIONNAIRE - PHQ9
SUM OF ALL RESPONSES TO PHQ QUESTIONS 1-9: 0
SUM OF ALL RESPONSES TO PHQ QUESTIONS 1-9: 0
2. FEELING DOWN, DEPRESSED OR HOPELESS: 0
1. LITTLE INTEREST OR PLEASURE IN DOING THINGS: 0

## 2021-04-21 NOTE — PROGRESS NOTES
2021    TELEHEALTH EVALUATION -- Audio/Visual (During TFBYK-16 public health emergency)    Surekha Ignacio (:  1980) has requested an audio/video evaluation for the following concern(s):    Chief Complaint   Patient presents with    URI     c/o cold for couple weeks thought was getting better but now coughing all the time, sneezing, runny nose, cough is non productive, no fever slight sore throat more from coughing       HPI:   Symptoms started 2 weeks ago with a raspy voice and then sinus pressure for a few days. It seemed to get better, but then she started to have increased pressure and a productive cough over the weekend. Symptoms started to worsen over the weekend. She has a dry cough,     Cough  This is a new problem. The current episode started 1 to 4 weeks ago (2 weeks). The problem has been waxing and waning. The cough is non-productive. Associated symptoms include myalgias (upper back), postnasal drip and a sore throat (mild with cough). Pertinent negatives include no chest pain, chills, ear pain, fever, headaches, rhinorrhea, shortness of breath or wheezing. Nothing aggravates the symptoms. She has tried nothing for the symptoms. The treatment provided no relief. Her past medical history is significant for environmental allergies.        BP Readings from Last 3 Encounters:   12/15/20 (!) 142/94   20 (!) 173/97   19 132/86        Pulse Readings from Last 3 Encounters:   12/15/20 73   20 66   19 64        Wt Readings from Last 3 Encounters:   12/15/20 176 lb (79.8 kg)   20 180 lb (81.6 kg)   19 181 lb 4.8 oz (82.2 kg)        No Known Allergies     Past Medical History:   Diagnosis Date    Anemia, iron deficiency 2017    Anxiety     Depression     Hypothyroid     Meniere disease         Past Surgical History:   Procedure Laterality Date    BREAST LUMPECTOMY  25 years ago   [facility]  [id number]          Social History     Tobacco Use    Smoking status: Current Every Day Smoker     Packs/day: 0.50     Start date: 2000    Smokeless tobacco: Never Used    Tobacco comment: Will contact PCP when ready to quit. Substance Use Topics    Alcohol use: No    Drug use: No       Prior to Visit Medications    Medication Sig Taking? Authorizing Provider   azithromycin (ZITHROMAX) 250 MG tablet Take 2 po Day 1, then 1 po Daily Days 2-5. Take with food. Yes JOCELINE Forrest CNP   levothyroxine (SYNTHROID) 100 MCG tablet Take 1 tablet by mouth Daily Yes JOCELINE Forrest CNP   Misc Natural Product Nasal (PONARIS) SOLN 1 spray by Nasal route 2 times daily Yes JOCELINE Forrest CNP   loratadine (CLARITIN) 10 MG tablet Take 1 tablet by mouth daily Yes JOCELINE Forrest CNP       Health Maintenance   Topic Date Due    Varicella vaccine (1 of 2 - 2-dose childhood series) Never done    HIV screen  Never done    COVID-19 Vaccine (1) Never done    Cervical cancer screen  Never done    Flu vaccine (Season Ended) 09/01/2021    DTaP/Tdap/Td vaccine (2 - Td) 07/02/2029    Pneumococcal 0-64 years Vaccine  Completed    Hepatitis C screen  Completed    Hepatitis A vaccine  Aged Out    Hepatitis B vaccine  Aged Out    Hib vaccine  Aged Out    Meningococcal (ACWY) vaccine  Aged Out        REVIEW OF SYMPTOMS:       Review of Systems   Constitutional: Positive for fatigue. Negative for appetite change, chills and fever. HENT: Positive for postnasal drip, sinus pressure (frontal) and sore throat (mild with cough). Negative for congestion, ear pain and rhinorrhea. Respiratory: Positive for cough. Negative for shortness of breath and wheezing. Cardiovascular: Negative for chest pain. Gastrointestinal: Positive for nausea. Negative for abdominal pain, diarrhea and vomiting. Musculoskeletal: Positive for myalgias (upper back). Negative for arthralgias. Allergic/Immunologic: Positive for environmental allergies. Neurological: Negative for dizziness, light-headedness and headaches. Psychiatric/Behavioral: Negative for sleep disturbance. Patient-Reported Vitals 4/21/2021   Patient-Reported Weight 180   Patient-Reported Temperature 99.5         PHYSICAL EXAM:     [ INSTRUCTIONS:  \"[x]\" Indicates a positive item  \"[]\" Indicates a negative item  -- DELETE ALL ITEMS NOT EXAMINED]    Constitutional:   [x] Appears well-developed and well-nourished [x] No apparent distress    [x] Abnormal-  Appears sick    Mental status:  [x] Alert and awake  [x] Oriented to person/place/time [x]Able to follow commands      Eyes:  EOM    []  Normal  [] Abnormal-  Sclera  [x]  Normal  [] Abnormal -         Discharge [x]  None visible  [] Abnormal -    HENT:   [x] Normocephalic, atraumatic. [x] Abnormal - hoarse voice  [x] Mouth/Throat: Mucous membranes are moist.     External Ears:  [x] Normal  [] Abnormal-     Neck:  [x] No visualized mass     Pulmonary/Chest:   [x] Respiratory effort normal.  [x] No visualized signs of difficulty breathing or respiratory distress  [] Abnormal-      Musculoskeletal:    [] Normal gait with no signs of ataxia. [] Normal range of motion of neck  [] Abnormal-     Neurological:      [x] No Facial Asymmetry (Cranial nerve 7 motor function) (limited exam to video visit)          [x] No gaze palsy        [] Abnormal-         Skin:        [x] No significant exanthematous lesions or discoloration noted on facial skin         [] Abnormal-            Psychiatric:       [x] Normal Affect [x] No Hallucinations        [] Abnormal-     Other pertinent observable physical exam findings: none. PHQ Scores 4/21/2021 3/25/2019   PHQ2 Score 0 0   PHQ9 Score 0 0     Interpretation of Total Score Depression Severity: 1-4 = Minimal depression, 5-9 = Mild depression, 10-14 = Moderate depression, 15-19 = Moderately severe depression, 20-27 = Severe depression     PLAN:       ICD-10-CM    1.  Acute frontal sinusitis, recurrence 10:10 AM.     An electronic signature was used to authenticate this note.

## 2021-04-23 LAB
SARS-COV-2: NORMAL
SARS-COV-2: NOT DETECTED
SOURCE: NORMAL

## 2021-07-19 ENCOUNTER — HOSPITAL ENCOUNTER (EMERGENCY)
Age: 41
Discharge: HOME | End: 2021-07-19
Attending: EMERGENCY MEDICINE
Payer: COMMERCIAL

## 2021-07-19 ENCOUNTER — APPOINTMENT (OUTPATIENT)
Dept: GENERAL RADIOLOGY | Age: 41
End: 2021-07-19
Payer: COMMERCIAL

## 2021-07-19 VITALS
WEIGHT: 10 LBS | HEART RATE: 81 BPM | HEIGHT: 60 IN | OXYGEN SATURATION: 98 % | BODY MASS INDEX: 1.97 KG/M2 | SYSTOLIC BLOOD PRESSURE: 172 MMHG | TEMPERATURE: 98.9 F | DIASTOLIC BLOOD PRESSURE: 93 MMHG | RESPIRATION RATE: 16 BRPM

## 2021-07-19 DIAGNOSIS — T07.XXXA MULTIPLE LACERATIONS: Primary | ICD-10-CM

## 2021-07-19 PROCEDURE — 6370000000 HC RX 637 (ALT 250 FOR IP): Performed by: SPECIALIST

## 2021-07-19 PROCEDURE — 99285 EMERGENCY DEPT VISIT HI MDM: CPT

## 2021-07-19 PROCEDURE — 2500000003 HC RX 250 WO HCPCS: Performed by: EMERGENCY MEDICINE

## 2021-07-19 PROCEDURE — 12004 RPR S/N/AX/GEN/TRK7.6-12.5CM: CPT

## 2021-07-19 PROCEDURE — 73130 X-RAY EXAM OF HAND: CPT

## 2021-07-19 RX ORDER — CEPHALEXIN 250 MG/1
500 CAPSULE ORAL ONCE
Status: COMPLETED | OUTPATIENT
Start: 2021-07-19 | End: 2021-07-19

## 2021-07-19 RX ORDER — CEPHALEXIN 500 MG/1
500 CAPSULE ORAL 4 TIMES DAILY
Qty: 28 CAPSULE | Refills: 0 | Status: SHIPPED | OUTPATIENT
Start: 2021-07-19 | End: 2021-07-26

## 2021-07-19 RX ORDER — LIDOCAINE HYDROCHLORIDE 10 MG/ML
20 INJECTION, SOLUTION INFILTRATION; PERINEURAL ONCE
Status: COMPLETED | OUTPATIENT
Start: 2021-07-19 | End: 2021-07-19

## 2021-07-19 RX ORDER — DIAPER,BRIEF,INFANT-TODD,DISP
EACH MISCELLANEOUS ONCE
Status: COMPLETED | OUTPATIENT
Start: 2021-07-19 | End: 2021-07-19

## 2021-07-19 RX ORDER — DIAPER,BRIEF,INFANT-TODD,DISP
EACH MISCELLANEOUS ONCE
Status: DISCONTINUED | OUTPATIENT
Start: 2021-07-19 | End: 2021-07-19

## 2021-07-19 RX ORDER — HYDROCODONE BITARTRATE AND ACETAMINOPHEN 5; 325 MG/1; MG/1
1 TABLET ORAL EVERY 6 HOURS PRN
Qty: 10 TABLET | Refills: 0 | Status: SHIPPED | OUTPATIENT
Start: 2021-07-19 | End: 2021-07-22

## 2021-07-19 RX ADMIN — LIDOCAINE HYDROCHLORIDE 20 ML: 10 INJECTION, SOLUTION INFILTRATION; PERINEURAL at 18:40

## 2021-07-19 RX ADMIN — BACITRACIN ZINC: 500 OINTMENT TOPICAL at 20:15

## 2021-07-19 RX ADMIN — CEPHALEXIN 500 MG: 250 CAPSULE ORAL at 21:07

## 2021-07-19 ASSESSMENT — PAIN DESCRIPTION - LOCATION: LOCATION: FINGER (COMMENT WHICH ONE)

## 2021-07-19 ASSESSMENT — PAIN DESCRIPTION - DESCRIPTORS: DESCRIPTORS: TINGLING

## 2021-07-19 ASSESSMENT — PAIN DESCRIPTION - ORIENTATION: ORIENTATION: LEFT

## 2021-07-19 ASSESSMENT — ENCOUNTER SYMPTOMS
COUGH: 0
VOMITING: 0
SHORTNESS OF BREATH: 0
NAUSEA: 0

## 2021-07-19 ASSESSMENT — PAIN SCALES - GENERAL: PAINLEVEL_OUTOF10: 8

## 2021-07-19 ASSESSMENT — PAIN DESCRIPTION - FREQUENCY: FREQUENCY: CONTINUOUS

## 2021-07-19 ASSESSMENT — PAIN DESCRIPTION - PAIN TYPE: TYPE: ACUTE PAIN

## 2021-07-19 NOTE — ED PROVIDER NOTES
[facility]  eMERGENCY dEPARTMENT eNCOUnter      Pt Name: Matteo Lucia  MRN: [id number]  [place] 1980  Date of evaluation: 7/19/2021      CHIEF COMPLAINT       Chief Complaint   Patient presents with    Laceration     left hand 2nd through 5th digit          HISTORY OF PRESENT ILLNESS    Matteo Lucia is a 46 y.o. female who presents with multiple lacerations to her left hand patient was on a ladder doing some work with a saws all when she slipped and started to fall she grabbed a piece of sheet metal with her left hand as she slid down the ladder and a cut the palmar aspect of her index long ring and fifth finger she did not suffer any other injury she landed on her feet her last tetanus was in 2019 the pain is sharp worse with movement she says she is having some problems flexing the fingers      REVIEW OF SYSTEMS         Review of Systems   Constitutional: Negative for chills and fever. HENT:        Patient has Ménière's disease and decreased hearing in the right ear   Respiratory: Negative for cough and shortness of breath. Gastrointestinal: Negative for nausea and vomiting. Musculoskeletal:        Left hand injury as documented   Skin: Negative for pallor and rash. PAST MEDICAL HISTORY    has a past medical history of Anemia, iron deficiency, Anxiety, Depression, Hypothyroid, and Meniere disease. SURGICAL HISTORY      has a past surgical history that includes Tubal ligation; Dilation and curettage of uterus (2018); and Breast lumpectomy (25 years ago). CURRENT MEDICATIONS       Previous Medications    LEVOTHYROXINE (SYNTHROID) 100 MCG TABLET    Take 1 tablet by mouth Daily    LORATADINE (CLARITIN) 10 MG TABLET    Take 1 tablet by mouth daily    MISC NATURAL PRODUCT NASAL (PONARIS) SOLN    1 spray by Nasal route 2 times daily       ALLERGIES     has No Known Allergies. FAMILY HISTORY     She indicated that her mother is alive. She indicated that her father is alive.  She indicated that her half-brother is alive. She indicated that all of her three half-sisters are alive. family history includes Anemia in her half-sister; Asthma in her father; Cancer in her mother; Depression in her half-brother and half-sister; Diabetes in her father; High Blood Pressure in her father, half-brother, half-sister, and mother; Mental Illness in her half-sister and half-sister; Other in her father; Sleep Apnea in her father. SOCIAL HISTORY      reports that she has been smoking. She started smoking about 21 years ago. She has been smoking about 0.50 packs per day. She has never used smokeless tobacco. She reports that she does not drink alcohol and does not use drugs. PHYSICAL EXAM     INITIAL VITALS:  height is 5' (1.524 m) and weight is 10 lb (4.536 kg). Her tympanic temperature is 98.9 °F (37.2 °C). Her blood pressure is 120/47 (abnormal) and her pulse is 64. Her respiration is 16 and oxygen saturation is 96%. Physical Exam  Constitutional:       Appearance: Normal appearance. She is well-developed. HENT:      Head: Normocephalic and atraumatic. Eyes:      Conjunctiva/sclera: Conjunctivae normal.      Pupils: Pupils are equal, round, and reactive to light. Cardiovascular:      Rate and Rhythm: Normal rate and regular rhythm. Pulmonary:      Effort: Pulmonary effort is normal.      Breath sounds: Normal breath sounds. Musculoskeletal:         General: No tenderness. Cervical back: Normal range of motion.       Comments: Patient has multiple lacerations she to the palm of her hand and the finger region she has a 2 cm laceration at the DIP flexor crease of the index finger she has a 2-1/2 cm laceration between the DIP and PIP flexor crease on the long finger she has a 2 and half centimeter laceration between the flexor crease of the PIP and DIP on the fourth finger she has 2 lacerations to the fifth finger 1 is a V-shaped laceration between the flexor crease of the PIP and DIP joint it is 2 and half centimeters in length she also has a second laceration right at the flexor crease of the MCP joint at that a centimeter and a half in length sensations intact all the digits she is able to flex at the PIP joint of all fingers has difficulty when isolated flexing at the DIP on all 4 fingers,  there is no nail involvement   Skin:     General: Skin is warm and dry. Neurological:      Mental Status: She is alert and oriented to person, place, and time. Psychiatric:         Behavior: Behavior normal.           DIFFERENTIAL DIAGNOSIS/ MDM:     Multiple finger lacerations requiring suturing and exploration    DIAGNOSTIC RESULTS     EKG: All EKG's are interpreted by the Emergency Department Physician who either signs or Co-signs this chart in the absence of a cardiologist.        RADIOLOGY:   I directly visualized the following  images and reviewed the radiologist interpretations:          ED BEDSIDE ULTRASOUND:       LABS:  Labs Reviewed - No data to display        EMERGENCY DEPARTMENT COURSE:   Vitals:    Vitals:    07/19/21 1818   BP: (!) 120/47   Pulse: 64   Resp: 16   Temp: 98.9 °F (37.2 °C)   TempSrc: Tympanic   SpO2: 96%   Weight: 10 lb (4.536 kg)   Height: 5' (1.524 m)     -------------------------  BP: (!) 120/47, Temp: 98.9 °F (37.2 °C), Pulse: 64, Resp: 16        Re-evaluation Notes    Resting comfortably    CRITICAL CARE:   None        CONSULTS:      PROCEDURES:  Repair of 4 finger lacerations as described the fingers were cleansed with Hibiclens then anesthetized with local anesthetic 1% lidocaine was used each finger were then was copiously irrigated with sterile saline explored through full range of motion I could see no obvious tendon injuries in any of the lacerations however the exam was somewhat difficult again with isolation she has difficulty flexing at the DIP joint of the index long and ring finger as well as a little finger.   The lacerations were then closed loosely with 5-0 Ethilon 3 sutures were placed in interrupted fashion on the index finger, 4 sutures were placed on the long finger, 4 sutures were placed on the ring finger, 5 sutures were used to close the V-shaped flap on the fifth finger and 2 were used to close the laceration at the flexor crease of the MCP joint bacitracin was applied and dressing was applied at this point and x-ray is pending plan is antibiotics and follow-up with hand surgery    FINAL IMPRESSION    No diagnosis found. DISPOSITION/PLAN   DISPOSITION care is passed to the oncoming physician Dr. Austen De La Rosa at the end of my shift 1930 hrs. Condition on Disposition    Stable    PATIENT REFERRED TO:  No follow-up provider specified. DISCHARGE MEDICATIONS:  New Prescriptions    No medications on file       (Please note that portions of this note were completed with a voice recognition program.  Efforts were made to edit the dictations but occasionally words are mis-transcribed.)    Fabian Ballard MD,, MD, F.A.A.E.M.   Attending Emergency Physician                          Fabian Ballard MD  07/19/21 3692

## 2021-07-20 NOTE — ED NOTES
Writer cleansed sutured lacerations and the rest of the fingers with normal saline. Area's were patted dry with gauze, bacitracin was applied, and covered with stretch gauze. Writer wrapped each finger individually and then placed and wrap around all fingers to help hold the gauze in place.      Lalitha Parikh RN  07/19/21 2030

## 2021-07-20 NOTE — ED PROVIDER NOTES
ADDENDUM:      Care of this patient was assumed from Dr. Linda Mcadams. The patient was seen for Laceration (left hand 2nd through 5th digit )  . The patient's initial evaluation and plan have been discussed with the prior provider who initially evaluated the patient. Nursing Notes, Past Medical Hx, Past Surgical Hx, Social Hx, Allergies, and Family Hx were all reviewed. Diagnostic Results       RADIOLOGY:   Non-plain film images such as CT, Ultrasound and MRI are read by the radiologist. Lina Gonzalez radiographic images are visualized and the radiologist interpretations are reviewed as follows:     XR HAND LEFT (MIN 3 VIEWS)    Result Date: 7/19/2021  EXAMINATION: THREE XRAY VIEWS OF THE LEFT HAND 7/19/2021 7:54 pm COMPARISON: None. HISTORY: ORDERING SYSTEM PROVIDED HISTORY: Lacerations second third and fourth and fifth finger TECHNOLOGIST PROVIDED HISTORY: Lacerations second third and fourth and fifth finger Reason for Exam: Lacerations of second, third, fourth and 5th digits of left hand after falling while holing onto ladder Acuity: Acute Type of Exam: Initial FINDINGS: Some soft tissue swelling 2nd and 3rd fingers but no large soft tissue defect, lucency or radiopaque foreign body. No fracture identified. No dislocation. No marked degenerative change. Probable slight ulnar minus variance. No fracture no dislocation. No radiopaque foreign body. STS 2nd and 3rd digits. LABS:   No results found for this visit on 07/19/21. RECENT VITALS:  BP: (!) 172/93, Temp: 98.9 °F (37.2 °C), Pulse: 81, Resp: 16     ED Course     The patient was given the following medications:  Orders Placed This Encounter   Medications    lidocaine 1 % injection 20 mL    DISCONTD: bacitracin zinc ointment    bacitracin zinc ointment    cephALEXin (KEFLEX) capsule 500 mg     Order Specific Question:   Antimicrobial Indications     Answer:    Other     Order Specific Question:   Other Abx Indication     Answer:   Multiple lacerations    cephALEXin (KEFLEX) 500 MG capsule     Sig: Take 1 capsule by mouth 4 times daily for 7 days     Dispense:  28 capsule     Refill:  0    HYDROcodone-acetaminophen (NORCO) 5-325 MG per tablet     Sig: Take 1 tablet by mouth every 6 hours as needed for Pain for up to 3 days. Dispense:  10 tablet     Refill:  0     During the emergency department course, patient was given Keflex 5 mg orally, bacitracin ointment and dressing was applied per nursing staff. Medical Decision Making      43-year-old female patient presents to the emergency department for evaluation of multiple lacerations on the left hand which she sustained prior to arrival.  Initial history and physical examination and procedure note per Dr. Lux Look note. Upon reevaluation, patient is resting comfortably and does not appear to be in any pain or distress. The sutured lacerations where dressed per nursing staff. Patient denies any tingling or numbness distally. Her plain x-rays are unremarkable with no acute fracture or dislocation. Patient was apparently unable to flex her fingers at the DIP joints. Plan is to discharge the patient with instructions drink plenty of oral fluids, take Tylenol and ibuprofen as needed for the pain, Keflex 4 times daily for 7 days as prescribed, Norco for uncontrolled pain, follow-up with PCP for suture removal and follow-up with hand specialist Dr. Malu Vasquez for further evaluation of possible tendon rupture, return if worse. Disposition     FINAL IMPRESSION      1.  Multiple lacerations          DISPOSITION/PLAN   DISPOSITION Decision To Discharge 07/19/2021 08:57:48 PM      PATIENT REFERRED TO:  David Prater MD  [address] [facility]  [phone]    Call in 1 day  For reevaluation of current symptoms, For wound re-check    JOCELINE Sarabia - CNP  [address] [facility]  [phone]    Call in 10 days  For suture removal    [facility]  [address]  [phone]    If symptoms worsen      DISCHARGE MEDICATIONS:  New Prescriptions    CEPHALEXIN (KEFLEX) 500 MG CAPSULE    Take 1 capsule by mouth 4 times daily for 7 days    HYDROCODONE-ACETAMINOPHEN (NORCO) 5-325 MG PER TABLET    Take 1 tablet by mouth every 6 hours as needed for Pain for up to 3 days. (Please note that portions of this note were completed with a voice recognition program.  Efforts were made to edit the dictations but occasionally words are mis-transcribed.)    Sid Rodríguez MD,, MD, F.A.C.E.P.   Attending Emergency Medicine Physician                Sid Rodríguez MD  07/20/21 0680

## 2021-07-21 ENCOUNTER — OFFICE VISIT (OUTPATIENT)
Dept: ORTHOPEDIC SURGERY | Age: 41
End: 2021-07-21
Payer: COMMERCIAL

## 2021-07-21 VITALS
DIASTOLIC BLOOD PRESSURE: 78 MMHG | WEIGHT: 176 LBS | HEIGHT: 60 IN | BODY MASS INDEX: 34.55 KG/M2 | HEART RATE: 79 BPM | SYSTOLIC BLOOD PRESSURE: 134 MMHG

## 2021-07-21 DIAGNOSIS — S66.922A HAND LACERATION INVOLVING TENDON, LEFT, INITIAL ENCOUNTER: Primary | ICD-10-CM

## 2021-07-21 DIAGNOSIS — S61.412A HAND LACERATION INVOLVING TENDON, LEFT, INITIAL ENCOUNTER: Primary | ICD-10-CM

## 2021-07-21 PROCEDURE — 99203 OFFICE O/P NEW LOW 30 MIN: CPT | Performed by: ORTHOPAEDIC SURGERY

## 2021-07-21 NOTE — LETTER
[address] department of [place] [address]  Phone: [phone]  Fax: [phone]    Demetria Padilla MD        July 21, 2021     Patient: Paramjit Poe   YOB: 1980   Date of Visit: 7/21/2021       To Whom it May Concern:    Paramjit Poe was seen in my clinic on 7/21/2021. She should remain out of work until 9-2-21. If you have any questions or concerns, please don't hesitate to call.     Sincerely,         Demetria Padilla MD

## 2021-07-22 NOTE — PROGRESS NOTES
History and Physical    Patient's Name/Date of Birth: Davidson Gerardo / 1980, 46 y.o. yo    Date: July 22, 2021     PCP: JOCELINE Fine CNP     History of Present Illness: This 68-year-old right-hand-dominant female presents with laceration involving all the digits of her left hand sustained on 7/19/2021. Reportedly she had grabbed a steel edge of a door resulting in a laceration across the volar aspect of all of her digits. She reported was unable to flex any of her digits. She was seen in the ER and had the wound irrigated and closed. She reported no numbness or tingling of her fingers. Family history negative    Past Medical History:   Diagnosis Date    Anemia, iron deficiency 2017    Anxiety     Depression     Hypothyroid     Meniere disease       Past Surgical History:   Procedure Laterality Date    BREAST LUMPECTOMY  25 years ago    DILATION AND CURETTAGE OF UTERUS  2018    TUBAL LIGATION        Allergies: Patient has no known allergies. Current Outpatient Medications   Medication Sig Dispense Refill    cephALEXin (KEFLEX) 500 MG capsule Take 1 capsule by mouth 4 times daily for 7 days 28 capsule 0    HYDROcodone-acetaminophen (NORCO) 5-325 MG per tablet Take 1 tablet by mouth every 6 hours as needed for Pain for up to 3 days. 10 tablet 0    Misc Natural Product Nasal (PONARIS) SOLN 1 spray by Nasal route 2 times daily 1 Bottle     loratadine (CLARITIN) 10 MG tablet Take 1 tablet by mouth daily 30 tablet 5     No current facility-administered medications for this visit. Social History     Tobacco Use    Smoking status: Current Every Day Smoker     Packs/day: 0.50     Start date: 2000    Smokeless tobacco: Never Used    Tobacco comment: Will contact PCP when ready to quit.     Substance Use Topics    Alcohol use: No        Review of Systems:  No numbness or tingling  Other than stated above in the HPI is negative      Physical exam:     General appearance: no acute

## 2021-07-26 ENCOUNTER — TELEPHONE (OUTPATIENT)
Dept: FAMILY MEDICINE CLINIC | Age: 41
End: 2021-07-26

## 2021-07-26 LAB — GLUCOSE BLD-MCNC: 88 MG/DL (ref 70–100)

## 2021-07-27 LAB — SARS-COV-2, POC: NEGATIVE

## 2021-08-04 ENCOUNTER — OFFICE VISIT (OUTPATIENT)
Dept: ORTHOPEDIC SURGERY | Age: 41
End: 2021-08-04
Payer: COMMERCIAL

## 2021-08-04 ENCOUNTER — HOSPITAL ENCOUNTER (OUTPATIENT)
Dept: OCCUPATIONAL THERAPY | Age: 41
Setting detail: THERAPIES SERIES
Discharge: HOME | End: 2021-08-04
Payer: COMMERCIAL

## 2021-08-04 VITALS
HEIGHT: 60 IN | WEIGHT: 176 LBS | SYSTOLIC BLOOD PRESSURE: 148 MMHG | BODY MASS INDEX: 34.55 KG/M2 | DIASTOLIC BLOOD PRESSURE: 82 MMHG

## 2021-08-04 DIAGNOSIS — S66.802S: Primary | ICD-10-CM

## 2021-08-04 PROCEDURE — 97165 OT EVAL LOW COMPLEX 30 MIN: CPT | Performed by: OCCUPATIONAL THERAPIST

## 2021-08-04 PROCEDURE — 97760 ORTHOTIC MGMT&TRAING 1ST ENC: CPT | Performed by: OCCUPATIONAL THERAPIST

## 2021-08-04 PROCEDURE — 99212 OFFICE O/P EST SF 10 MIN: CPT | Performed by: ORTHOPAEDIC SURGERY

## 2021-08-04 RX ORDER — HYDROCODONE BITARTRATE AND ACETAMINOPHEN 5; 325 MG/1; MG/1
1 TABLET ORAL EVERY 4 HOURS PRN
Qty: 30 TABLET | Refills: 0 | Status: SHIPPED | OUTPATIENT
Start: 2021-08-04 | End: 2021-08-11

## 2021-08-04 RX ORDER — DIAZEPAM 5 MG/1
TABLET ORAL
Qty: 1 TABLET | Refills: 0 | Status: SHIPPED | OUTPATIENT
Start: 2021-08-04 | End: 2021-08-04

## 2021-08-04 ASSESSMENT — PAIN DESCRIPTION - LOCATION: LOCATION: HAND

## 2021-08-04 ASSESSMENT — PAIN SCALES - WONG BAKER: WONGBAKER_NUMERICALRESPONSE: 8

## 2021-08-04 ASSESSMENT — PAIN DESCRIPTION - PAIN TYPE: TYPE: ACUTE PAIN

## 2021-08-04 ASSESSMENT — PAIN DESCRIPTION - ORIENTATION: ORIENTATION: LEFT

## 2021-08-04 NOTE — PROGRESS NOTES
Occupational Therapy  Occupational Therapy Initial Assessment  Date:  2021    Patient Name: David Way  MRN: [id number]     :  1980     Treatment Diagnosis: s/p flexor tendon repair L hand    Restrictions  Restrictions/Precautions  Required Braces or Orthoses?: Yes  Required Braces or Orthoses  Left Upper Extremity Brace/Splint: L hand extension block splint (index, long, ring, little)     Subjective    General  Chart Reviewed: Yes  Patient assessed for rehabilitation services?: Yes  Family / Caregiver Present: Yes  Referring Practitioner: MATILDA Capps  Diagnosis: s/p flexor tendon repair L hand  OT Visit Information  Onset Date: 21  Subjective  Subjective: Patient rec'd in waiting room, pleasant and cooperative 46 yr old female with L hand laceration, s/p flexor tendon repair  Pain Assessment  Pain Assessment: Faces  Brown-Baker Pain Rating: Hurts whole lot  Pain Type: Acute pain  Pain Location: Hand  Pain Orientation: Left  Vital Signs  Patient Currently in Pain: Yes     Home Living  Social/Functional History  Lives With: Family  Type of Home: House  ADL Assistance: Independent  Homemaking Assistance: Independent  Homemaking Responsibilities: Yes  Ambulation Assistance: Independent  Transfer Assistance: Independent  Active : Yes  Occupation: Full time employment  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 Splint fabricated and fitted to patient, foam applied to area for button on index finger. Incision/stitches on palm of hand and fingers covered in non-adherent bandages and wrapped with elastomull bandaging. Several stockinet issued to patient for use under splint; patient education wear and care completed. Patient states splint feels good.     Assessment   Assessment  Performance deficits / Impairments: Decreased coordination;Decreased fine

## 2021-08-04 NOTE — FLOWSHEET NOTE
[facility]    [x] [place]  Phone: [phone]  Fax: [phone]      [] [place]  Phone: [phone]  Fax: [phone]    Occupational Therapy Daily Treatment Note  Date:  2021    Patient Name:  Ayad Argueta    :  1980  MRN: [id number]  Restrictions/Precautions:Restrictions/Precautions  Required Braces or Orthoses?: Yes  Required Braces or Orthoses  Left Upper Extremity Brace/Splint: L hand extension block splint (index, long, ring, little)     Medical/Treatment Diagnosis Information:   Diagnosis: s/p flexor tendon repair L hand   Insurance/Certification information:   Physician Information: Referring Practitioner: Tanya Olivares of [facility] signed (Y/N):  n    Visit# / total visits:  1    Pain level: 9/10     Progress Note: [x]  Yes  []  No  Next due by: Visit #10      Date of evaluation/re-evaluation: 21-21    Time In:210  Time Out:330     Subjective:     See progress note    Objective/Assessment:   See progress note      Exercises:       Therapeutic Exercise  [] Provided verbal/tactile cueing for activities related to strengthening, flexibility, endurance, ROM. (82066)  Neuro  Re-Ed  [] Provided verbal/tactile cueing for activities related to improving balance, coordination, kinesthetic sense, posture, motor skill, proprioception. (12265)     Therapeutic Activities/ADL:   [] Provided use of dynamic activities to improve functional performance ()  [] Provided self-care/home management training for activities of daily living and compensatory training (71384)     Manual Treatments:   [] Provided manual therapy to mobilize soft tissue/joints for the purpose of modulating pain, promoting relaxation, increasing ROM, reducing/eliminating soft tissue swelling/inflammation/restriction, improving soft tissue extensibility. (16238)     Orthotic Management:   [] Provided assessment and fitting orthotic device for improved functional performance.

## 2021-08-04 NOTE — PROGRESS NOTES
History and Physical    Patient's Name/Date of Birth: Keerthi Peters / 1980, 46 y.o. yo    Date: August 4, 2021     PCP: JOCELINE Anton CNP     History of Present Illness:    Patient presents 8 days status post repair zone 1 flexor tendon injury left index finger and repair zone 2 flexor tendon injuries left long ring and small fingers, date of surgery 7/26/2021. Patient reports she is doing fairly well having had the hand protected with an extension block splint. Family history negative    Past Medical History:   Diagnosis Date    Anemia, iron deficiency 2017    Anxiety     Depression     Hypothyroid     Meniere disease       Past Surgical History:   Procedure Laterality Date    BREAST LUMPECTOMY  25 years ago    DILATION AND CURETTAGE OF UTERUS  2018    TUBAL LIGATION        Allergies: Patient has no known allergies. Current Outpatient Medications   Medication Sig Dispense Refill    diazePAM (VALIUM) 5 MG tablet Take one tablet one hour prior to appointment 1 tablet 0    HYDROcodone-acetaminophen (NORCO) 5-325 MG per tablet Take 1 tablet by mouth every 4 hours as needed for Pain for up to 7 days. Intended supply: 7 days. Take lowest dose possible to manage pain 30 tablet 0    Misc Natural Product Nasal (PONARIS) SOLN 1 spray by Nasal route 2 times daily 1 Bottle     loratadine (CLARITIN) 10 MG tablet Take 1 tablet by mouth daily 30 tablet 5     No current facility-administered medications for this visit. Social History     Tobacco Use    Smoking status: Current Every Day Smoker     Packs/day: 0.50     Start date: 2000    Smokeless tobacco: Never Used    Tobacco comment: Will contact PCP when ready to quit.     Substance Use Topics    Alcohol use: No        Review of Systems:  No numbness or tingling  Other than stated above in the HPI is negative      Physical exam:     General appearance: no acute distress  Head: NAD  Neck: supple  Lungs: No audible wheezing, respiratory rate normal  Heart: Perfusion to all extremeties  Extremities: Examination of her left hand revealed that she had a normal cascade or positioning of her digits. The incision had all healed well. There are no signs of infection. She was assessed to be neurovascular intact. Assessment:  Patient presents 1 week following surgical repair of zone 1 flexor tendon injury left index finger in zone 2 flexion injury of the left long ring and small fingers. Plan:  Patient needs to be protected with an extension block splint which was prescribed for her. She will be seen in 1 week for removal of sutures. She will also attend therapy on a weekly basis to monitor her passive motion of the digits. She will be reassessed again in 4 weeks for me to review her functional status. No orders of the defined types were placed in this encounter.               Rebecca Mccollum MD,MD 8/4/2021 at 1:02 PM

## 2021-08-05 ENCOUNTER — HOSPITAL ENCOUNTER (OUTPATIENT)
Dept: OCCUPATIONAL THERAPY | Age: 41
Setting detail: THERAPIES SERIES
Discharge: HOME | End: 2021-08-05
Payer: COMMERCIAL

## 2021-08-05 PROCEDURE — 97165 OT EVAL LOW COMPLEX 30 MIN: CPT

## 2021-08-05 PROCEDURE — 97140 MANUAL THERAPY 1/> REGIONS: CPT

## 2021-08-05 ASSESSMENT — PAIN DESCRIPTION - FREQUENCY: FREQUENCY: CONTINUOUS

## 2021-08-05 ASSESSMENT — PAIN DESCRIPTION - LOCATION: LOCATION: HAND

## 2021-08-05 ASSESSMENT — PAIN DESCRIPTION - DESCRIPTORS: DESCRIPTORS: SHARP;ACHING;BURNING

## 2021-08-05 ASSESSMENT — PAIN DESCRIPTION - PAIN TYPE: TYPE: ACUTE PAIN

## 2021-08-05 ASSESSMENT — PAIN DESCRIPTION - ORIENTATION: ORIENTATION: LEFT

## 2021-08-05 ASSESSMENT — PAIN SCALES - GENERAL: PAINLEVEL_OUTOF10: 5

## 2021-08-05 NOTE — PROGRESS NOTES
Occupational Therapy  Occupational Therapy Initial Assessment  Date:  2021    Patient Name: Davidson Gerardo  MRN: [id number]     :  1980     Treatment Diagnosis: s/p flexor tendon repair L hand    Subjective   General  Chart Reviewed: Yes  Patient assessed for rehabilitation services?: Yes  Family / Caregiver Present: Yes (daughter)  Referring Practitioner: Luciana Merchant  Diagnosis: s/p flexor tendon repair L hand  OT Visit Information  Onset Date: 21  Subjective  Subjective: Patient rec'd in waiting room, pleasant and cooperative 46 yr old female with L hand laceration, s/p flexor tendon repair on 2021. General Comment  Comments: Pt presesnts to therapy wearing dorsal blocking splint. Pt reports that it is uncomfortable, but is unable to pin-point what is bothering her. Pt educated to continue wearing and figure out what is bothering her and adjustments can be made. Pt with stitches on D2-D5 and palm of hand. Pt reports that the stitches will be removed on 2021. Pain Assessment  Pain Assessment: 0-10  Pain Level: 5  Pain Type: Acute pain  Pain Location: Hand  Pain Orientation: Left  Pain Descriptors: Charolett Dario; Aching;Burning  Pain Frequency: Continuous  Vital Signs  Patient Currently in Pain: Yes    Home Living  Social/Functional History  Lives With: Family  Type of Home: House  ADL Assistance: Independent  Homemaking Assistance: Independent  Homemaking Responsibilities: Yes  Meal Prep Responsibility: Primary  Laundry Responsibility: Primary  Cleaning Responsibility: Primary  Ambulation Assistance: Independent  Transfer Assistance: Independent  Active : Yes  Occupation: Full time employment  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 D2 5.0cm from palm of hand; D3 4.5cm from palm of hand; D4 4.5cm from palm of hand; D5 5.0cm from palm of hand  LUE edema present? Yes (Pt also presents with edema in all digits of L hand, but measurements not taken this date due to stitches present and patient apprehensive to having stitches touched. Pt will have stitches removed on 8/9/2021.)  MCPS: 19.3 cm  Distal palmar crease: 20.3 cm  Wrist crease: 17.4 cm  RUE edema present?  Yes  MCPS: 19.0 cm  Distal palmar crease: 20.6 cm  Wrist crease: 17.4 cm  RUE AROM (degrees)  RUE AROM : WFL  RUE General AROM: Patient able to make a full fist  R Wrist Flexion 0-80: 75°  R Wrist Extension 0-70: 75°  Right Hand AROM (degrees)  Right Hand AROM: WFL  LUE Strength  L Hand General: NT  RUE Strength  R Hand General: NT    Assessment   Assessment  Performance deficits / Impairments: Decreased coordination;Decreased fine motor control  Treatment Diagnosis: s/p flexor tendon repair L hand  Prognosis: Good  Decision Making: Low Complexity  REQUIRES OT FOLLOW UP: Yes       Plan   Plan  Times per week: 5x/wk for 2 weeks        3x/wk for 2 weeks  Plan weeks: 4    Goals  Short term goals  Time Frame for Short term goals: 2 weeks  Short term goal 1: Adjust splint as needed  Short term goal 2: Provide scar/edema management and techniques and patient education for improved ROM of L hand  Long term goals  Time Frame for Long term goals : 4 weeks  Long term goal 1: Patient will report decreased pain of < 1/10 with use and movement  Long term goal 2: Patient will demonstrated improved ROM of L hand by being able to make a full fist for picking up objects  Long term goal 3: Update goals as protocol allows patient to progress       Therapy Time   Individual Concurrent Group Co-treatment   Time In 1403         Time Out 1456         Minutes 53         Timed Code Treatment Minutes: 25 Minutes       SCOTT Manjarrez, RIKYR/L

## 2021-08-05 NOTE — PLAN OF CARE
Occupational Therapy    [x] [place]  Phone: [phone]  Fax: [phone]      [] [place]  Phone: [phone]  Fax: [phone]       To: Referring Practitioner: Madie Manzano      Patient: Cori Meade  : 1980  MRN: [id number]  Evaluation Date: 2021      Diagnosis Information:  Diagnosis: s/p flexor tendon repair L hand         Occupational Therapy Certification/Re-Certification Form  Dear Dr. Guillermina Zaldivar,   The following patient has been evaluated for occupational therapy services and for therapy to continue, insurance requires physician review of the treatment plan. Please review the attached evaluation and/or summary of the patient's plan of care, and verify that you agree therapy should continue by signing the attached document and sending it back to our office.     Plan of Care/Treatment to date: 2021-9/3/2021  [x] Therapeutic Exercise   [] Modalities:  [x] Therapeutic Activity    [x] Ultrasound  [] Electrical Stimulation   [x] Activities of Daily Living    [x] Paraffin   [] Kinesiotaping  [x] Neuromuscular Re-education   [] Iontophoresis [x] Coldpack/hotpack   [x] Instruction in HEP     [x] Orthotics/splint  [x] Manual Therapy     Other:  [] Aquatic Therapy      []       Frequency/Duration: (5x/wk for 2 weeks; 3x/wk for 2 weeks)  # Days per week: [] 1 day # Weeks: [] 1 week [] 5 weeks      [] 2 days   [] 2 weeks [] 6 weeks     [x] 3-5 days   [] 3 weeks [] 7 weeks     [] 4 days   [x] 4 weeks [] 8 weeks  Goals:  Short term goals  Time Frame for Short term goals: 2 weeks  Short term goal 1: Adjust splint as needed  Short term goal 2: Provide scar/edema management and techniques and patient education for improved ROM of L hand  Long term goals  Time Frame for Long term goals : 4 weeks  Long term goal 1: Patient will report decreased pain of < 1/10 with use and movement  Long term goal 2: Patient will demonstrated improved ROM of L hand by being able to make a full fist for picking up objects  Long term goal 3: Update goals as protocol allows patient to progress      Rehab Potential: [] excellent [x] good [] fair  [] poor     Electronically signed by:  SCOTT More, OTR/L       If you have any questions or concerns, please don't hesitate to call.   Thank you for your referral.      Physician Signature:________________________________Date:__________________  By signing above, therapists plan is approved by physician

## 2021-08-05 NOTE — FLOWSHEET NOTE
[facility]    [x] [place]  Phone: [phone]  Fax: [phone]      [] [place]  Phone: [phone]  Fax: [phone]    Occupational Therapy Daily Treatment Note  Date:  2021    Patient Name:  Abdoulaye Gonzalez    :  1980  MRN: [id number]  Restrictions/Precautions:      Medical/Treatment Diagnosis Information:   Diagnosis: s/p flexor tendon repair L hand      Insurance/Certification information:   Physician Information: Referring Practitioner: Rico Jacobsen of care signed (Y/N):  n    Visit# / total visits:      Pain level: 5/10     Progress Note: [x]  Yes  []  No  Next due by: Visit #10      Date of evaluation/re-evaluation: 2021-9/3/2021    Time In: 0  Time Out:  256    Subjective: Patient rec'd in waiting room, pleasant and cooperative 46 yr old female with L hand laceration, s/p flexor tendon repair on 2021. Pt presesnts to therapy wearing dorsal blocking splint. Pt reports that it is uncomfortable, but is unable to pin-point what is bothering her. Pt educated to continue wearing and figure out what is bothering her and adjustments can be made. Pt with stitches on D2-D5 and palm of hand. Pt reports that the stitches will be removed on 2021. Objective/Assessment:   Initial evaluation completed this date, see progress note for details. 1:1 manual therapy provided for increased ROM of L hand, see below for details.  Patient educated to begin PROM of making fingers into a fist and isolating fingers at each joint for flexion every 2 hours x25 reps    Exercises:   Exercise/Equipment Resistance/Repetitions Other comments   PROM:  MP flexion  PIP flexion  DIP flexion  Composite fist  Wrist flexion with fingers extended   10x  10x  10x  10x  10x (Isolated each finger)                                                                     Therapeutic Exercise  [] Provided verbal/tactile cueing for activities related to strengthening, flexibility, endurance, ROM. (13913)  Neuro  Re-Ed  [] Provided verbal/tactile cueing for activities related to improving balance, coordination, kinesthetic sense, posture, motor skill, proprioception. (36427)     Therapeutic Activities/ADL:   [] Provided use of dynamic activities to improve functional performance (98942)  [] Provided self-care/home management training for activities of daily living and compensatory training (68744)     Manual Treatments:   [x] Provided manual therapy to mobilize soft tissue/joints for the purpose of modulating pain, promoting relaxation, increasing ROM, reducing/eliminating soft tissue swelling/inflammation/restriction, improving soft tissue extensibility. (21529)     Orthotic Management:   [] Provided assessment and fitting orthotic device for improved functional performance.  (26972)    Service Based Modalities:  1 low complexity eval    Timed Code Treatment Minutes:   25 manual    Total Treatment Minutes:   53    Treatment/Activity Tolerance:  [x] Patient tolerated treatment well [] Patient limited by fatique  [] Patient limited by pain  [] Patient limited by other medical complications  [] Other:     Prognosis: [x] Good [] Fair  [] Poor    Patient Requires Follow-up: [x] Yes  [] No      Goals:  Short term goals  Time Frame for Short term goals: 2 weeks  Short term goal 1: Adjust splint as needed  Short term goal 2: Provide scar/edema management and techniques and patient education for improved ROM of L hand  Long term goals  Time Frame for Long term goals : 4 weeks  Long term goal 1: Patient will report decreased pain of < 1/10 with use and movement  Long term goal 2: Patient will demonstrated improved ROM of L hand by being able to make a full fist for picking up objects  Long term goal 3: Update goals as protocol allows patient to progress    Plan:   [] Continue per plan of care [] Alter current plan (see comments)  [x] Plan of care initiated [] Hold pending MD visit [] Discharge    Plan for Next Session:      Electronically signed by:  SCOTT Medeiros, OTR/L

## 2021-08-06 ENCOUNTER — HOSPITAL ENCOUNTER (OUTPATIENT)
Dept: OCCUPATIONAL THERAPY | Age: 41
Setting detail: THERAPIES SERIES
Discharge: HOME | End: 2021-08-06
Payer: COMMERCIAL

## 2021-08-06 PROCEDURE — 97760 ORTHOTIC MGMT&TRAING 1ST ENC: CPT

## 2021-08-06 PROCEDURE — 97140 MANUAL THERAPY 1/> REGIONS: CPT

## 2021-08-06 NOTE — FLOWSHEET NOTE
[facility]    [x] [place]  Phone: [phone]  Fax: [phone]      [] [place]  Phone: [phone]  Fax: [phone]    Occupational Therapy Daily Treatment Note  Date:  2021    Patient Name:  Anson Chavarria    :  1980  MRN: [id number]  Restrictions/Precautions:      Medical/Treatment Diagnosis Information:   Diagnosis: s/p flexor tendon repair L hand      Insurance/Certification information:   Physician Information:   Referring Practitioner: Rahul Mancilla of care signed (Y/N):  n    Visit# / total visits:     Pain level: 2-3/10     Progress Note: [x]  Yes  []  No  Next due by: Visit #10      Date of evaluation/re-evaluation: 2021-9/3/2021    Time In: 230  Time Out:  325    Subjective: Patient rec'd in waiting room, pleasant and cooperative 46 yr old female with L hand laceration, s/p flexor tendon repair on 2021. Pt presesnts to therapy wearing dorsal blocking splint. Pt reports that it is uncomfortable near the MCP on her 2nd digit. Objective/Assessment:   1:1 manual therapy provided for increased ROM of L hand, see below for details. Padding added to dorsal blocking splint at MCP of 2nd digit for improved comfort. Patient reports that it feels good after it was altered. Patient educated to continue to let therapy know if it bothers her anymore. Patient verbalized good understanding.      Exercises:   Exercise/Equipment Resistance/Repetitions Other comments   PROM:  MP flexion  PIP flexion  DIP flexion  Composite fist  Wrist flexion with fingers extended   15x  15x  15x  15x  15x (Isolated each finger)                                                                     Therapeutic Exercise  [] Provided verbal/tactile cueing for activities related to strengthening, flexibility, endurance, ROM. (09997)  Neuro  Re-Ed  [] Provided verbal/tactile cueing for activities related to improving balance, coordination, kinesthetic sense, posture, motor skill, proprioception. (80773)     Therapeutic Activities/ADL:   [] Provided use of dynamic activities to improve functional performance (35646)  [] Provided self-care/home management training for activities of daily living and compensatory training (99499)     Manual Treatments:   [x] Provided manual therapy to mobilize soft tissue/joints for the purpose of modulating pain, promoting relaxation, increasing ROM, reducing/eliminating soft tissue swelling/inflammation/restriction, improving soft tissue extensibility. (26587)     Orthotic Management:   [x] Provided assessment and fitting orthotic device for improved functional performance.  (61810)    Service Based Modalities:      Timed Code Treatment Minutes:   10 orthotics 45 manual    Total Treatment Minutes:  55    Treatment/Activity Tolerance:  [x] Patient tolerated treatment well [] Patient limited by fatique  [] Patient limited by pain  [] Patient limited by other medical complications  [] Other:     Prognosis: [x] Good [] Fair  [] Poor    Patient Requires Follow-up: [x] Yes  [] No      Goals:  Short term goals  Time Frame for Short term goals: 2 weeks  Short term goal 1: Adjust splint as needed  Short term goal 2: Provide scar/edema management and techniques and patient education for improved ROM of L hand  Long term goals  Time Frame for Long term goals : 4 weeks  Long term goal 1: Patient will report decreased pain of < 1/10 with use and movement  Long term goal 2: Patient will demonstrated improved ROM of L hand by being able to make a full fist for picking up objects  Long term goal 3: Update goals as protocol allows patient to progress    Plan:   [x] Continue per plan of care [] Alter current plan (see comments)  [] Plan of care initiated [] Hold pending MD visit [] Discharge    Plan for Next Session:      Electronically signed by:  SCOTT Medeiros, OTR/L

## 2021-08-09 ENCOUNTER — HOSPITAL ENCOUNTER (OUTPATIENT)
Dept: OCCUPATIONAL THERAPY | Age: 41
Setting detail: THERAPIES SERIES
Discharge: HOME | End: 2021-08-09
Payer: COMMERCIAL

## 2021-08-09 ENCOUNTER — NURSE ONLY (OUTPATIENT)
Dept: ORTHOPEDIC SURGERY | Age: 41
End: 2021-08-09
Payer: COMMERCIAL

## 2021-08-09 DIAGNOSIS — Z09 POSTOP CHECK: ICD-10-CM

## 2021-08-09 PROCEDURE — 99999 PR OFFICE/OUTPT VISIT,PROCEDURE ONLY: CPT | Performed by: ORTHOPAEDIC SURGERY

## 2021-08-09 PROCEDURE — 97140 MANUAL THERAPY 1/> REGIONS: CPT | Performed by: OCCUPATIONAL THERAPIST

## 2021-08-09 NOTE — FLOWSHEET NOTE
[facility]    [x] [place]  Phone: [phone]  Fax: [phone]      [] [place]  Phone: [phone]  Fax: [phone]    Occupational Therapy Daily Treatment Note  Date:  2021    Patient Name:  Uma Schrader    :  1980  MRN: [id number]  Restrictions/Precautions:      Medical/Treatment Diagnosis Information:   Diagnosis: s/p flexor tendon repair L hand      Insurance/Certification information:   Physician Information:   Referring Practitioner: Lonnie Beach of care signed (Y/N):  n    Visit# / total visits: 3/18    Pain level: 2/10     Progress Note: []  Yes  [x]  No  Next due by: Visit #10      Date of evaluation/re-evaluation: 2021-9/3/2021    Time In: 135  Time Out:  205    Subjective: Patient rec'd in waiting room, pleasant and cooperative 46 yr old female with L hand laceration, s/p flexor tendon repair on 2021. Pt presesnts to therapy wearing dorsal blocking splint and reports stitches have been removed this date per MD office. Objective/Assessment:   1:1 manual therapy provided for increased ROM of L hand, see below for details. After completing PROM, aquaphor added to hand to assist with dry skin, non-adherent bandages and gauze over scabbed areas prior to donning splint.     Exercises:   Exercise/Equipment Resistance/Repetitions Other comments   PROM:  MP flexion  PIP flexion  DIP flexion  Composite fist  Wrist flexion with fingers extended   15x  15x  15x  15x  15x (Isolated each finger)                                             Therapeutic Exercise  [] Provided verbal/tactile cueing for activities related to strengthening, flexibility, endurance, ROM. (13906)  Neuro  Re-Ed  [] Provided verbal/tactile cueing for activities related to improving balance, coordination, kinesthetic sense, posture, motor skill, proprioception. (58858)     Therapeutic Activities/ADL:   [] Provided use of dynamic activities to improve functional performance (76908)  [] Provided self-care/home management training for activities of daily living and compensatory training (88741)     Manual Treatments:   [x] Provided manual therapy to mobilize soft tissue/joints for the purpose of modulating pain, promoting relaxation, increasing ROM, reducing/eliminating soft tissue swelling/inflammation/restriction, improving soft tissue extensibility. (35283)     Orthotic Management:   [x] Provided assessment and fitting orthotic device for improved functional performance.  (80464)    Service Based Modalities:      Timed Code Treatment Minutes:   30 manual    Total Treatment Minutes:  30    Treatment/Activity Tolerance:  [x] Patient tolerated treatment well [] Patient limited by fatique  [] Patient limited by pain  [] Patient limited by other medical complications  [] Other:     Prognosis: [x] Good [] Fair  [] Poor    Patient Requires Follow-up: [x] Yes  [] No      Goals:  Short term goals  Time Frame for Short term goals: 2 weeks  Short term goal 1: Adjust splint as needed  Short term goal 2: Provide scar/edema management and techniques and patient education for improved ROM of L hand  Long term goals  Time Frame for Long term goals : 4 weeks  Long term goal 1: Patient will report decreased pain of < 1/10 with use and movement  Long term goal 2: Patient will demonstrated improved ROM of L hand by being able to make a full fist for picking up objects  Long term goal 3: Update goals as protocol allows patient to progress    Plan:   [x] Continue per plan of care [] Alter current plan (see comments)  [] Plan of care initiated [] Hold pending MD visit [] Discharge    Plan for Next Session:      Electronically signed by:  SCOTT Becerril, OTR/L

## 2021-08-10 ENCOUNTER — HOSPITAL ENCOUNTER (OUTPATIENT)
Dept: OCCUPATIONAL THERAPY | Age: 41
Setting detail: THERAPIES SERIES
Discharge: HOME | End: 2021-08-10
Payer: COMMERCIAL

## 2021-08-10 PROCEDURE — 97140 MANUAL THERAPY 1/> REGIONS: CPT

## 2021-08-10 NOTE — FLOWSHEET NOTE
[facility]    [x] [place]  Phone: [phone]  Fax: [phone]      [] [place]  Phone: [phone]  Fax: [phone]    Occupational Therapy Daily Treatment Note  Date:  8/10/2021    Patient Name:  Rema Akbar    :  1980  MRN: [id number]  Restrictions/Precautions:      Medical/Treatment Diagnosis Information:   Diagnosis: s/p flexor tendon repair L hand      Insurance/Certification information:   Physician Information:   Referring Practitioner: Mark Lopez of care signed (Y/N):  n    Visit# / total visits:      Pain level: 3/10     Progress Note: []  Yes  [x]  No  Next due by: Visit #10      Date of evaluation/re-evaluation: 2021-9/3/2021    Time In: 1100  Time Out:  6470    Subjective:   Patient rec'd in waiting room, pleasant and cooperative 46 yr old female with L hand laceration, s/p flexor tendon repair on 2021. Pt reports washing L hand in shower with wet wash cloth and soap yesterday. Objective/Assessment:   1:1 manual therapy provided for increased ROM of L hand, see below for details. Per observation, one loose stitch present at lateral palm. Able to remove with tweezers without difficulty. Patient presents with no seepage and dead skin/scarring.     Exercises:   Exercise/Equipment Resistance/Repetitions Other comments   PROM:  MP flexion  PIP flexion  DIP flexion  Composite fist  Wrist flexion with fingers extended   25x  25x  25x  25x  25x (Isolated each finger)                                             Therapeutic Exercise  [] Provided verbal/tactile cueing for activities related to strengthening, flexibility, endurance, ROM. (89421)  Neuro  Re-Ed  [] Provided verbal/tactile cueing for activities related to improving balance, coordination, kinesthetic sense, posture, motor skill, proprioception. (45964)     Therapeutic Activities/ADL:   [] Provided use of dynamic activities to improve functional performance (17649)  [] Provided self-care/home management training for activities of daily living and compensatory training (93462)     Manual Treatments:   [x] Provided manual therapy to mobilize soft tissue/joints for the purpose of modulating pain, promoting relaxation, increasing ROM, reducing/eliminating soft tissue swelling/inflammation/restriction, improving soft tissue extensibility. (12667)     Orthotic Management:   [x] Provided assessment and fitting orthotic device for improved functional performance.  (18146)    Service Based Modalities:      Timed Code Treatment Minutes:   56 manual    Total Treatment Minutes:  56    Treatment/Activity Tolerance:  [x] Patient tolerated treatment well [] Patient limited by fatique  [] Patient limited by pain  [] Patient limited by other medical complications  [] Other:     Prognosis: [x] Good [] Fair  [] Poor    Patient Requires Follow-up: [x] Yes  [] No      Goals:  Short term goals  Time Frame for Short term goals: 2 weeks  Short term goal 1: Adjust splint as needed  Short term goal 2: Provide scar/edema management and techniques and patient education for improved ROM of L hand  Long term goals  Time Frame for Long term goals : 4 weeks  Long term goal 1: Patient will report decreased pain of < 1/10 with use and movement  Long term goal 2: Patient will demonstrated improved ROM of L hand by being able to make a full fist for picking up objects  Long term goal 3: Update goals as protocol allows patient to progress    Plan:   [x] Continue per plan of care [] Alter current plan (see comments)  [] Plan of care initiated [] Hold pending MD visit [] Discharge    Plan for Next Session:      Electronically signed by:  YIMI Aburto

## 2021-08-11 ENCOUNTER — HOSPITAL ENCOUNTER (OUTPATIENT)
Dept: OCCUPATIONAL THERAPY | Age: 41
Setting detail: THERAPIES SERIES
Discharge: HOME | End: 2021-08-11
Payer: COMMERCIAL

## 2021-08-11 PROCEDURE — 97140 MANUAL THERAPY 1/> REGIONS: CPT

## 2021-08-11 NOTE — FLOWSHEET NOTE
[facility]    [x] [place]  Phone: [phone]  Fax: [phone]      [] [place]  Phone: [phone]  Fax: [phone]    Occupational Therapy Daily Treatment Note  Date:  2021    Patient Name:  Desmond Aguilar    :  1980  MRN: [id number]  Restrictions/Precautions:      Medical/Treatment Diagnosis Information:   Diagnosis: s/p flexor tendon repair L hand      Insurance/Certification information:   Physician Information:   Referring Practitioner: Allen Boards of care signed (Y/N):  n    Visit# / total visits:      Pain level: 2-3/10     Progress Note: []  Yes  [x]  No  Next due by: Visit #10      Date of evaluation/re-evaluation: 2021-9/3/2021    Time In: 0  Time Out:  328    Subjective:   Patient rec'd in waiting room, pleasant and cooperative 46 yr old female with L hand laceration, s/p flexor tendon repair on 2021. Pt reports no increased pain and compliance with HEP. Objective/Assessment:   1:1 manual therapy provided for increased ROM of L hand  Scarring and dead skin still present throughout (L) hand  PROM applied (see flow sheet for details)  Initiated scar tissue mobilization to hand/fingers; educated patient on role and technique of scar massage with patient return demo and understanding.     Exercises:   Exercise/Equipment Resistance/Repetitions Other comments   PROM:  MP flexion  PIP flexion  DIP flexion  Composite fist  Wrist flexion with fingers extended   25x  25x  25x  25x  25x (Isolated each finger)   Passive Finger Flexion with Active Wrist Extension 25x    Passive Wrist Flexion with Active Finger Flexion 25x                                    Therapeutic Exercise  [] Provided verbal/tactile cueing for activities related to strengthening, flexibility, endurance, ROM. (50755)  Neuro  Re-Ed  [] Provided verbal/tactile cueing for activities related to improving balance, coordination, kinesthetic sense, posture, Caller would like to discuss an/a form completion w/ immunization records. Writer advised caller of callback within 24-72 hours.    Patient Name: Gayla Rico  Caller Name: Mother Rebecca  Name of Facility: n/a  Callback Number: [phone]  Best Availability: Anytime  Can A Detailed Message Be left? yes  Fax Number: n/a  Additional Info: Patients mother calling states that she is going to fax over form for patient to attend  that needs to be completed along with a copy of her immunization records. Patients mother is asking to come into the office to pick completed forms up if she can be contacted once this has been done. Writer unable to further assist.   Did you confirm the message with the caller?: yes    Thank you,  Manda Kennedy     motor skill, proprioception. (27622)     Therapeutic Activities/ADL:   [] Provided use of dynamic activities to improve functional performance (63327)  [] Provided self-care/home management training for activities of daily living and compensatory training (44385)     Manual Treatments:   [x] Provided manual therapy to mobilize soft tissue/joints for the purpose of modulating pain, promoting relaxation, increasing ROM, reducing/eliminating soft tissue swelling/inflammation/restriction, improving soft tissue extensibility. (07274)     Orthotic Management:   [x] Provided assessment and fitting orthotic device for improved functional performance.  (31358)    Service Based Modalities:      Timed Code Treatment Minutes:   58 manual    Total Treatment Minutes:  58    Treatment/Activity Tolerance:  [x] Patient tolerated treatment well [] Patient limited by fatique  [] Patient limited by pain  [] Patient limited by other medical complications  [] Other:     Prognosis: [x] Good [] Fair  [] Poor    Patient Requires Follow-up: [x] Yes  [] No      Goals:  Short term goals  Time Frame for Short term goals: 2 weeks  Short term goal 1: Adjust splint as needed  Short term goal 2: Provide scar/edema management and techniques and patient education for improved ROM of L hand  Long term goals  Time Frame for Long term goals : 4 weeks  Long term goal 1: Patient will report decreased pain of < 1/10 with use and movement  Long term goal 2: Patient will demonstrated improved ROM of L hand by being able to make a full fist for picking up objects  Long term goal 3: Update goals as protocol allows patient to progress    Plan:   [x] Continue per plan of care [] Alter current plan (see comments)  [] Plan of care initiated [] Hold pending MD visit [] Discharge    Plan for Next Session:      Electronically signed by:  YIMI Osman

## 2021-08-12 ENCOUNTER — HOSPITAL ENCOUNTER (OUTPATIENT)
Dept: OCCUPATIONAL THERAPY | Age: 41
Setting detail: THERAPIES SERIES
Discharge: HOME | End: 2021-08-12
Payer: COMMERCIAL

## 2021-08-12 PROCEDURE — 97140 MANUAL THERAPY 1/> REGIONS: CPT | Performed by: OCCUPATIONAL THERAPIST

## 2021-08-12 NOTE — FLOWSHEET NOTE
[facility]    [x] [place]  Phone: [phone]  Fax: [phone]      [] [place]  Phone: [phone]  Fax: [phone]    Occupational Therapy Daily Treatment Note  Date:  2021    Patient Name:  Desmond Aguilar    :  1980  MRN: [id number]  Restrictions/Precautions:      Medical/Treatment Diagnosis Information:   Diagnosis: s/p flexor tendon repair L hand      Insurance/Certification information:   Physician Information:   Referring Practitioner: Allen Boards of care signed (Y/N):  n    Visit# / total visits:      Pain level: 2-3/10     Progress Note: []  Yes  [x]  No  Next due by: Visit #10      Date of evaluation/re-evaluation: 2021-9/3/2021    Time In: 100  Time Out:  145    Subjective:   Patient rec'd in waiting room, pleasant and cooperative 46 yr old female with L hand laceration, s/p flexor tendon repair on 2021. Pt reports no increased pain and compliance with HEP. Objective/Assessment:   1:1 manual therapy provided for increased ROM of L hand  Scarring and dead skin still present throughout (L) hand, mostly fingers  Elastomer fitted over scarring palm of hand, patient to use primarily at night for scar management  PROM applied (see flow sheet for details)  Aquaphor applied to stitched areas, elastomer placed on palm, non-adherent pad over fingers and wrapped in gauze.     Exercises:   Exercise/Equipment Resistance/Repetitions Other comments   PROM:  MP flexion  PIP flexion  DIP flexion  Composite fist  Wrist flexion with fingers extended   25x  25x  25x  25x  25x (Isolated each finger)   Passive Finger Flexion with Active Wrist Extension 25x    Passive Wrist Flexion with Active Finger Flexion 25x                                    Therapeutic Exercise  [] Provided verbal/tactile cueing for activities related to strengthening, flexibility, endurance, ROM. (45231)  Neuro  Re-Ed  [] Provided verbal/tactile cueing for activities related to improving balance, coordination, kinesthetic sense, posture, motor skill, proprioception. (39610)     Therapeutic Activities/ADL:   [] Provided use of dynamic activities to improve functional performance (62425)  [] Provided self-care/home management training for activities of daily living and compensatory training (52411)     Manual Treatments:   [x] Provided manual therapy to mobilize soft tissue/joints for the purpose of modulating pain, promoting relaxation, increasing ROM, reducing/eliminating soft tissue swelling/inflammation/restriction, improving soft tissue extensibility. (18930)     Orthotic Management:   [x] Provided assessment and fitting orthotic device for improved functional performance.  (57374)    Service Based Modalities:      Timed Code Treatment Minutes:   45 manual    Total Treatment Minutes:  45    Treatment/Activity Tolerance:  [x] Patient tolerated treatment well [] Patient limited by fatique  [] Patient limited by pain  [] Patient limited by other medical complications  [] Other:     Prognosis: [x] Good [] Fair  [] Poor    Patient Requires Follow-up: [x] Yes  [] No      Goals:  Short term goals  Time Frame for Short term goals: 2 weeks  Short term goal 1: Adjust splint as needed  Short term goal 2: Provide scar/edema management and techniques and patient education for improved ROM of L hand  Long term goals  Time Frame for Long term goals : 4 weeks  Long term goal 1: Patient will report decreased pain of < 1/10 with use and movement  Long term goal 2: Patient will demonstrated improved ROM of L hand by being able to make a full fist for picking up objects  Long term goal 3: Update goals as protocol allows patient to progress    Plan:   [x] Continue per plan of care [] Alter current plan (see comments)  [] Plan of care initiated [] Hold pending MD visit [] Discharge    Plan for Next Session:      Electronically signed by:  Chris Pelletier OT

## 2021-08-13 ENCOUNTER — HOSPITAL ENCOUNTER (OUTPATIENT)
Dept: OCCUPATIONAL THERAPY | Age: 41
Setting detail: THERAPIES SERIES
Discharge: HOME | End: 2021-08-13
Payer: COMMERCIAL

## 2021-08-13 PROCEDURE — 97140 MANUAL THERAPY 1/> REGIONS: CPT

## 2021-08-13 PROCEDURE — 97760 ORTHOTIC MGMT&TRAING 1ST ENC: CPT

## 2021-08-13 NOTE — FLOWSHEET NOTE
[facility]    [x] [place]  Phone: [phone]  Fax: [phone]      [] [place]  Phone: [phone]  Fax: [phone]    Occupational Therapy Daily Treatment Note  Date:  2021    Patient Name:  Davidson Gerardo    :  1980  MRN: [id number]  Restrictions/Precautions:      Medical/Treatment Diagnosis Information:   Diagnosis: s/p flexor tendon repair L hand      Insurance/Certification information:   Physician Information:   Referring Practitioner: Thien Corona of care signed (Y/N):  n    Visit# / total visits:      Pain level: 4/10     Progress Note: []  Yes  [x]  No  Next due by: Visit #10      Date of evaluation/re-evaluation: 2021-9/3/2021    Time In: 135  Time Out:  233    Subjective:   Patient rec'd in waiting room, pleasant and cooperative 46 yr old female with L hand laceration, s/p flexor tendon repair on 2021. Pt reports some increased pain in left hand and wrist with exercises. Patient reports that she has completed HEP a little less within the last day due to water/ issues at home. Objective/Assessment:   1:1 manual therapy provided for increased ROM of L hand  Scarring and dead skin still present throughout (L) hand, mostly fingers  PROM applied (see flow sheet for details)  Splint strap adjusted over palm of hand for improved comfort d/t it rubbing near her scar. Padding added underneath strap over wrist d/t pt report of it rubbing.      Exercises:   Exercise/Equipment Resistance/Repetitions Other comments   PROM:  MP flexion  PIP flexion  DIP flexion  Composite fist  Wrist flexion with fingers extended   25x  25x  25x  25x  25x (Isolated each finger)   Passive Finger Flexion with Active Wrist Extension 25x    Passive Wrist Flexion with Active Finger Flexion 25x                                    Therapeutic Exercise  [] Provided verbal/tactile cueing for activities related to strengthening, flexibility, endurance, ROM. (71512)  Neuro  Re-Ed  [] Provided verbal/tactile cueing for activities related to improving balance, coordination, kinesthetic sense, posture, motor skill, proprioception. (49653)     Therapeutic Activities/ADL:   [] Provided use of dynamic activities to improve functional performance (69900)  [] Provided self-care/home management training for activities of daily living and compensatory training (63779)     Manual Treatments:   [x] Provided manual therapy to mobilize soft tissue/joints for the purpose of modulating pain, promoting relaxation, increasing ROM, reducing/eliminating soft tissue swelling/inflammation/restriction, improving soft tissue extensibility. (95354)     Orthotic Management:   [x] Provided assessment and fitting orthotic device for improved functional performance.  (05518)    Service Based Modalities:      Timed Code Treatment Minutes:  10 orthotic 48 manual    Total Treatment Minutes:  58    Treatment/Activity Tolerance:  [x] Patient tolerated treatment well [] Patient limited by fatique  [] Patient limited by pain  [] Patient limited by other medical complications  [] Other:     Prognosis: [x] Good [] Fair  [] Poor    Patient Requires Follow-up: [x] Yes  [] No      Goals:  Short term goals  Time Frame for Short term goals: 2 weeks  Short term goal 1: Adjust splint as needed  Short term goal 2: Provide scar/edema management and techniques and patient education for improved ROM of L hand  Long term goals  Time Frame for Long term goals : 4 weeks  Long term goal 1: Patient will report decreased pain of < 1/10 with use and movement  Long term goal 2: Patient will demonstrated improved ROM of L hand by being able to make a full fist for picking up objects  Long term goal 3: Update goals as protocol allows patient to progress    Plan:   [x] Continue per plan of care [] Alter current plan (see comments)  [] Plan of care initiated [] Hold pending MD visit [] Discharge    Plan for Next Session:      Electronically signed by:  SCOTT Hernandez, OTR/L

## 2021-08-16 ENCOUNTER — HOSPITAL ENCOUNTER (OUTPATIENT)
Dept: OCCUPATIONAL THERAPY | Age: 41
Setting detail: THERAPIES SERIES
Discharge: HOME | End: 2021-08-16
Payer: COMMERCIAL

## 2021-08-16 PROCEDURE — 97530 THERAPEUTIC ACTIVITIES: CPT | Performed by: OCCUPATIONAL THERAPIST

## 2021-08-16 NOTE — FLOWSHEET NOTE
[facility]    [x] [place]  Phone: [phone]  Fax: [phone]      [] [place]  Phone: [phone]  Fax: [phone]    Occupational Therapy Daily Treatment Note  Date:  2021    Patient Name:  Jevon Saravia    :  1980  MRN: [id number]  Restrictions/Precautions:      Medical/Treatment Diagnosis Information:   Diagnosis: s/p flexor tendon repair L hand      Insurance/Certification information:   Physician Information:   Referring Practitioner: Emily Santos of care signed (Y/N):  n    Visit# / total visits:      Pain level: 4/10     Progress Note: []  Yes  [x]  No  Next due by: Visit #10      Date of evaluation/re-evaluation: 2021-9/3/2021    Time In: 100  Time Out:  140    Subjective:   Patient rec'd in waiting room, pleasant and cooperative 46 yr old female with L hand laceration, s/p flexor tendon repair on 2021. Objective/Assessment:   1:1 manual therapy provided for increased ROM of L hand  PROM applied (see flow sheet for details)  Elastomer fitted on L little finger for scar management, patient also fitted with isotoner glove for edema control L hand.  Patient is independent with donning and doffing      Exercises:   Exercise/Equipment Resistance/Repetitions Other comments   PROM:  MP flexion  PIP flexion  DIP flexion  Composite fist  Wrist flexion with fingers extended   25x  25x  25x  25x  25x (Isolated each finger)   Passive Finger Flexion with Active Wrist Extension 25x    Passive Wrist Flexion with Active Finger Flexion 25x                                    Therapeutic Exercise  [] Provided verbal/tactile cueing for activities related to strengthening, flexibility, endurance, ROM. (37878)  Neuro  Re-Ed  [] Provided verbal/tactile cueing for activities related to improving balance, coordination, kinesthetic sense, posture, motor skill, proprioception. (68553)     Therapeutic Activities/ADL:   [] Provided use of dynamic activities to improve functional performance (60347)  [] Provided self-care/home management training for activities of daily living and compensatory training (14683)     Manual Treatments:   [x] Provided manual therapy to mobilize soft tissue/joints for the purpose of modulating pain, promoting relaxation, increasing ROM, reducing/eliminating soft tissue swelling/inflammation/restriction, improving soft tissue extensibility. (49442)     Orthotic Management:   [x] Provided assessment and fitting orthotic device for improved functional performance.  (88862)    Service Based Modalities:      Timed Code Treatment Minutes:  40 therapeutic activity    Total Treatment Minutes:  40    Treatment/Activity Tolerance:  [x] Patient tolerated treatment well [] Patient limited by fatique  [] Patient limited by pain  [] Patient limited by other medical complications  [] Other:     Prognosis: [x] Good [] Fair  [] Poor    Patient Requires Follow-up: [x] Yes  [] No      Goals:  Short term goals  Time Frame for Short term goals: 2 weeks  Short term goal 1: Adjust splint as needed  Short term goal 2: Provide scar/edema management and techniques and patient education for improved ROM of L hand  Long term goals  Time Frame for Long term goals : 4 weeks  Long term goal 1: Patient will report decreased pain of < 1/10 with use and movement  Long term goal 2: Patient will demonstrated improved ROM of L hand by being able to make a full fist for picking up objects  Long term goal 3: Update goals as protocol allows patient to progress    Plan:   [x] Continue per plan of care [] Alter current plan (see comments)  [] Plan of care initiated [] Hold pending MD visit [] Discharge    Plan for Next Session:      Electronically signed by:  SCOTT Farr, OTR/L

## 2021-08-17 ENCOUNTER — HOSPITAL ENCOUNTER (OUTPATIENT)
Dept: OCCUPATIONAL THERAPY | Age: 41
Setting detail: THERAPIES SERIES
Discharge: HOME | End: 2021-08-17
Payer: COMMERCIAL

## 2021-08-17 PROCEDURE — 97530 THERAPEUTIC ACTIVITIES: CPT | Performed by: OCCUPATIONAL THERAPIST

## 2021-08-17 NOTE — FLOWSHEET NOTE
[facility]    [x] [place]  Phone: [phone]  Fax: [phone]      [] [place]  Phone: [phone]  Fax: [phone]    Occupational Therapy Daily Treatment Note  Date:  2021    Patient Name:  Donna Morse    :  1980  MRN: [id number]  Restrictions/Precautions:      Medical/Treatment Diagnosis Information:   Diagnosis: s/p flexor tendon repair L hand      Insurance/Certification information:   Physician Information:   Referring Practitioner: Anu Whipple of care signed (Y/N):  n    Visit# / total visits:      Pain level: 3-4/10     Progress Note: []  Yes  [x]  No  Next due by: Visit #10      Date of evaluation/re-evaluation: 2021-9/3/2021    Time In: 105  Time Out:  135    Subjective:   Patient rec'd in waiting room, pleasant and cooperative 46 yr old female with L hand laceration, s/p flexor tendon repair on 2021. Objective/Assessment:   1:1 manual therapy provided for increased ROM of L hand  PROM applied (see flow sheet for details)  Elastomer fitted on middle and ring fingers for scar management, and new elastomer fitted on schmitt surface of hand. Patient reported that old one is not fitting.     Exercises:   Exercise/Equipment Resistance/Repetitions Other comments   PROM:  MP flexion  PIP flexion  DIP flexion  Composite fist  Wrist flexion with fingers extended   25x  25x  25x  25x  25x (Isolated each finger)   Passive Finger Flexion with Active Wrist Extension 25x    Passive Wrist Flexion with Active Finger Flexion 25x                                    Therapeutic Exercise  [] Provided verbal/tactile cueing for activities related to strengthening, flexibility, endurance, ROM. (51384)  Neuro  Re-Ed  [] Provided verbal/tactile cueing for activities related to improving balance, coordination, kinesthetic sense, posture, motor skill, proprioception. (15911)     Therapeutic Activities/ADL:   [] Provided use of dynamic activities to improve functional performance (56099)  [] Provided self-care/home management training for activities of daily living and compensatory training (06576)     Manual Treatments:   [x] Provided manual therapy to mobilize soft tissue/joints for the purpose of modulating pain, promoting relaxation, increasing ROM, reducing/eliminating soft tissue swelling/inflammation/restriction, improving soft tissue extensibility. (55552)     Orthotic Management:   [x] Provided assessment and fitting orthotic device for improved functional performance.  (24376)    Service Based Modalities:      Timed Code Treatment Minutes:  30 therapeutic activity    Total Treatment Minutes:  30    Treatment/Activity Tolerance:  [x] Patient tolerated treatment well [] Patient limited by fatique  [] Patient limited by pain  [] Patient limited by other medical complications  [] Other:     Prognosis: [x] Good [] Fair  [] Poor    Patient Requires Follow-up: [x] Yes  [] No      Goals:  Short term goals  Time Frame for Short term goals: 2 weeks  Short term goal 1: Adjust splint as needed  Short term goal 2: Provide scar/edema management and techniques and patient education for improved ROM of L hand  Long term goals  Time Frame for Long term goals : 4 weeks  Long term goal 1: Patient will report decreased pain of < 1/10 with use and movement  Long term goal 2: Patient will demonstrated improved ROM of L hand by being able to make a full fist for picking up objects  Long term goal 3: Update goals as protocol allows patient to progress    Plan:   [x] Continue per plan of care [] Alter current plan (see comments)  [] Plan of care initiated [] Hold pending MD visit [] Discharge    Plan for Next Session:      Electronically signed by:  SCOTT Hdez, OTR/L

## 2021-08-18 ENCOUNTER — HOSPITAL ENCOUNTER (OUTPATIENT)
Dept: OCCUPATIONAL THERAPY | Age: 41
Setting detail: THERAPIES SERIES
Discharge: HOME | End: 2021-08-18
Payer: COMMERCIAL

## 2021-08-18 PROCEDURE — 97140 MANUAL THERAPY 1/> REGIONS: CPT

## 2021-08-18 PROCEDURE — 97530 THERAPEUTIC ACTIVITIES: CPT

## 2021-08-18 NOTE — FLOWSHEET NOTE
[facility]    [x] [place]  Phone: [phone]  Fax: [phone]      [] [place]  Phone: [phone]  Fax: [phone]    Occupational Therapy Daily Treatment Note  Date:  2021    Patient Name:  Edilma Costa    :  1980  MRN: [id number]  Restrictions/Precautions:      Medical/Treatment Diagnosis Information:   Diagnosis: s/p flexor tendon repair L hand      Insurance/Certification information:   Physician Information:   Referring Practitioner: Hermilo Stamp of care signed (Y/N):  n    Visit# / total visits:  10/18    Pain level: 3/10     Progress Note: []  Yes  [x]  No  Next due by: Visit #10      Date of evaluation/re-evaluation: 2021-9/3/2021    Time In: 230  Time Out:  315    Subjective:   Patient rec'd in waiting room, pleasant and cooperative 46 yr old female with L hand laceration, s/p flexor tendon repair on 2021.     Objective/Assessment:   1:1 manual therapy provided for increased ROM of L hand  PROM applied (see flow sheet for details)  Educated patient on role of scar massage to decrease scar tissue within hand/fingers; discussed AE of using mini massager to assist in breakdown of scar tissue with patient verbalizing understanding    Exercises:   Exercise/Equipment Resistance/Repetitions Other comments   PROM:  MP flexion  PIP flexion  DIP flexion  Composite fist  Wrist flexion with fingers extended   25x  25x  25x  25x  25x (Isolated each finger)   Passive Finger Flexion with Active Wrist Extension 25x    Passive Wrist Flexion with Active Finger Flexion 25x                                    Therapeutic Exercise  [] Provided verbal/tactile cueing for activities related to strengthening, flexibility, endurance, ROM. (41097)  Neuro  Re-Ed  [] Provided verbal/tactile cueing for activities related to improving balance, coordination, kinesthetic sense, posture, motor skill, proprioception. (16934)     Therapeutic Activities/ADL: [x] Provided use of dynamic activities to improve functional performance (38082)  [] Provided self-care/home management training for activities of daily living and compensatory training (51863)     Manual Treatments:   [x] Provided manual therapy to mobilize soft tissue/joints for the purpose of modulating pain, promoting relaxation, increasing ROM, reducing/eliminating soft tissue swelling/inflammation/restriction, improving soft tissue extensibility. (98240)     Orthotic Management:   [x] Provided assessment and fitting orthotic device for improved functional performance.  (16685)    Service Based Modalities:      Timed Code Treatment Minutes:  30 therapeutic activity  15 manual therapy    Total Treatment Minutes:  45    Treatment/Activity Tolerance:  [x] Patient tolerated treatment well [] Patient limited by fatique  [] Patient limited by pain  [] Patient limited by other medical complications  [] Other:     Prognosis: [x] Good [] Fair  [] Poor    Patient Requires Follow-up: [x] Yes  [] No      Goals:  Short term goals  Time Frame for Short term goals: 2 weeks  Short term goal 1: Adjust splint as needed  Short term goal 2: Provide scar/edema management and techniques and patient education for improved ROM of L hand  Long term goals  Time Frame for Long term goals : 4 weeks  Long term goal 1: Patient will report decreased pain of < 1/10 with use and movement  Long term goal 2: Patient will demonstrated improved ROM of L hand by being able to make a full fist for picking up objects  Long term goal 3: Update goals as protocol allows patient to progress    Plan:   [x] Continue per plan of care [] Alter current plan (see comments)  [] Plan of care initiated [] Hold pending MD visit [] Discharge    Plan for Next Session:      Electronically signed by:  YIMI Arnold

## 2021-08-19 ENCOUNTER — HOSPITAL ENCOUNTER (OUTPATIENT)
Dept: OCCUPATIONAL THERAPY | Age: 41
Setting detail: THERAPIES SERIES
Discharge: HOME | End: 2021-08-19
Payer: COMMERCIAL

## 2021-08-19 PROCEDURE — 97530 THERAPEUTIC ACTIVITIES: CPT | Performed by: OCCUPATIONAL THERAPIST

## 2021-08-19 PROCEDURE — 97140 MANUAL THERAPY 1/> REGIONS: CPT | Performed by: OCCUPATIONAL THERAPIST

## 2021-08-19 NOTE — FLOWSHEET NOTE
[facility]    [x] [place]  Phone: [phone]  Fax: [phone]      [] [place]  Phone: [phone]  Fax: [phone]    Occupational Therapy Daily Treatment Note  Date:  2021    Patient Name:  Davidson Gerardo    :  1980  MRN: [id number]  Restrictions/Precautions:      Medical/Treatment Diagnosis Information:   Diagnosis: s/p flexor tendon repair L hand      Insurance/Certification information:   Physician Information:   Referring Practitioner: Thien Corona of care signed (Y/N):  n    Visit# / total visits:      Pain level: 3/10     Progress Note: []  Yes  [x]  No  Next due by: Visit #10      Date of evaluation/re-evaluation: 2021-9/3/2021    Time In: 100  Time Out:  125    Subjective:   Patient rec'd in waiting room, pleasant and cooperative 46 yr old female with L hand laceration, s/p flexor tendon repair on 2021. Objective/Assessment:   1:1 manual therapy provided for increased ROM of L hand  PROM applied (see flow sheet for details)  Continued with patient education for scar massage to decrease scar tissue within hand/fingers, issued dycem.     Exercises:   Exercise/Equipment Resistance/Repetitions Other comments   PROM:  MP flexion  PIP flexion  DIP flexion  Composite fist  Wrist flexion with fingers extended   25x  25x  25x  25x  25x (Isolated each finger)   Passive Finger Flexion with Active Wrist Extension 25x    Passive Wrist Flexion with Active Finger Flexion 25x                                    Therapeutic Exercise  [] Provided verbal/tactile cueing for activities related to strengthening, flexibility, endurance, ROM. (15708)  Neuro  Re-Ed  [] Provided verbal/tactile cueing for activities related to improving balance, coordination, kinesthetic sense, posture, motor skill, proprioception. (59504)     Therapeutic Activities/ADL:   [x] Provided use of dynamic activities to improve functional performance (32773)  []

## 2021-08-20 ENCOUNTER — HOSPITAL ENCOUNTER (OUTPATIENT)
Dept: OCCUPATIONAL THERAPY | Age: 41
Setting detail: THERAPIES SERIES
Discharge: HOME | End: 2021-08-20
Payer: COMMERCIAL

## 2021-08-20 PROCEDURE — 97140 MANUAL THERAPY 1/> REGIONS: CPT

## 2021-08-20 PROCEDURE — 97530 THERAPEUTIC ACTIVITIES: CPT

## 2021-08-20 NOTE — FLOWSHEET NOTE
[facility]    [x] [place]  Phone: [phone]  Fax: [phone]      [] [place]  Phone: [phone]  Fax: [phone]    Occupational Therapy Daily Treatment Note  Date:  2021    Patient Name:  Ramila Clements    :  1980  MRN: [id number]  Restrictions/Precautions:      Medical/Treatment Diagnosis Information:   Diagnosis: s/p flexor tendon repair L hand      Insurance/Certification information:   Physician Information:   Referring Practitioner: Sonu Ndiaye of care signed (Y/N):  y    Visit# / total visits:      Pain level: 3/10 \"Nothing I can't tolerate, but they have been hurting a little more. I think that's because I've been working it a little more. \"     Progress Note: []  Yes  [x]  No  Next due by: Visit #10      Date of evaluation/re-evaluation: 2021-9/3/2021    Time In: 9115  Time Out:  125    Subjective:   Patient rec'd in waiting room, pleasant and cooperative 46 yr old female with L hand laceration, s/p flexor tendon repair on 2021.     Objective/Assessment:   1:1 manual therapy provided for increased ROM of L hand  PROM applied (see flow sheet for details)  Soft tissue massage provided for scar management    Exercises:   Exercise/Equipment Resistance/Repetitions Other comments   PROM:  MP flexion  PIP flexion  DIP flexion  Composite fist  Wrist flexion with fingers extended   25x  25x  25x  25x  25x (Isolated each finger)   Passive Finger Flexion with Active Wrist Extension 25x    Passive Wrist Flexion with Active Finger Flexion 25x                                    Therapeutic Exercise  [] Provided verbal/tactile cueing for activities related to strengthening, flexibility, endurance, ROM. (98516)  Neuro  Re-Ed  [] Provided verbal/tactile cueing for activities related to improving balance, coordination, kinesthetic sense, posture, motor skill, proprioception. (39936)     Therapeutic Activities/ADL:   [x] Provided use of dynamic activities to improve functional performance (62012)  [] Provided self-care/home management training for activities of daily living and compensatory training (67487)     Manual Treatments:   [x] Provided manual therapy to mobilize soft tissue/joints for the purpose of modulating pain, promoting relaxation, increasing ROM, reducing/eliminating soft tissue swelling/inflammation/restriction, improving soft tissue extensibility. (36862)     Orthotic Management:   [x] Provided assessment and fitting orthotic device for improved functional performance.  (66592)    Service Based Modalities:      Timed Code Treatment Minutes:  15 therapeutic activity 25 manual therapy    Total Treatment Minutes:  40    Treatment/Activity Tolerance:  [x] Patient tolerated treatment well [] Patient limited by fatique  [] Patient limited by pain  [] Patient limited by other medical complications  [] Other:     Prognosis: [x] Good [] Fair  [] Poor    Patient Requires Follow-up: [x] Yes  [] No      Goals:  Short term goals  Time Frame for Short term goals: 2 weeks  Short term goal 1: Adjust splint as needed  Short term goal 2: Provide scar/edema management and techniques and patient education for improved ROM of L hand  Long term goals  Time Frame for Long term goals : 4 weeks  Long term goal 1: Patient will report decreased pain of < 1/10 with use and movement  Long term goal 2: Patient will demonstrated improved ROM of L hand by being able to make a full fist for picking up objects  Long term goal 3: Update goals as protocol allows patient to progress    Plan:   [x] Continue per plan of care [] Alter current plan (see comments)  [] Plan of care initiated [] Hold pending MD visit [] Discharge    Plan for Next Session:      Electronically signed by:  Rafi Gerard OT

## 2021-08-23 ENCOUNTER — HOSPITAL ENCOUNTER (OUTPATIENT)
Dept: OCCUPATIONAL THERAPY | Age: 41
Setting detail: THERAPIES SERIES
Discharge: HOME | End: 2021-08-23
Payer: COMMERCIAL

## 2021-08-23 PROCEDURE — 97530 THERAPEUTIC ACTIVITIES: CPT

## 2021-08-23 PROCEDURE — 97140 MANUAL THERAPY 1/> REGIONS: CPT

## 2021-08-23 NOTE — FLOWSHEET NOTE
[facility]    [x] [place]  Phone: [phone]  Fax: [phone]      [] [place]  Phone: [phone]  Fax: [phone]    Occupational Therapy Daily Treatment Note  Date:  2021    Patient Name:  David Way    :  1980  MRN: [id number]  Restrictions/Precautions:      Medical/Treatment Diagnosis Information:   Diagnosis: s/p flexor tendon repair L hand      Insurance/Certification information:   Physician Information:   Referring Practitioner: Lima Vale of care signed (Y/N):  y    Visit# / total visits:      Pain level: 3/10 \"Nothing I can't tolerate, but they have been hurting a little more. I think that's because I've been working it a little more. \"     Progress Note: []  Yes  [x]  No  Next due by: Visit #10      Date of evaluation/re-evaluation: 2021-9/3/2021    Time In: 100  Time Out:  155    Subjective:   Patient rec'd in waiting room, pleasant and cooperative 46 yr old female with L hand laceration, s/p flexor tendon repair on 2021. Objective/Assessment:   1:1 manual therapy provided for increased ROM of L hand  PROM applied (see flow sheet for details)  AROM initiated this date, see below for details. Included in HEP with handout provided. Straps on splint upgraded to non-elastic for improved fit, with patient agreeing that it was \"much better\". Patient educated to continue wear of splint and inform us if she is having any discomfort with new straps. Patient verbalized good understanding.     Exercises:   Exercise/Equipment Resistance/Repetitions Other comments   PROM:  MP flexion  PIP flexion  DIP flexion  Composite fist  Wrist flexion with fingers extended   25x  25x  25x  25x  25x (Isolated each finger)   Passive Finger Flexion with Active Wrist Extension 25x    Passive Wrist Flexion with Active Finger Flexion 25x    AROM:  Wrist flexion with fingers extended and wrist extension with fingers flexed  Thumb palmar abduction  Thumb opposition  Finger ab/adduction   20x        20x    20x  20x Issued for HEP   Tendon Glides 20x Issued for HEP                         Therapeutic Exercise  [] Provided verbal/tactile cueing for activities related to strengthening, flexibility, endurance, ROM. (91396)  Neuro  Re-Ed  [] Provided verbal/tactile cueing for activities related to improving balance, coordination, kinesthetic sense, posture, motor skill, proprioception. (99038)     Therapeutic Activities/ADL:   [x] Provided use of dynamic activities to improve functional performance (50062)  [] Provided self-care/home management training for activities of daily living and compensatory training (66488)     Manual Treatments:   [x] Provided manual therapy to mobilize soft tissue/joints for the purpose of modulating pain, promoting relaxation, increasing ROM, reducing/eliminating soft tissue swelling/inflammation/restriction, improving soft tissue extensibility. (35813)     Orthotic Management:   [x] Provided assessment and fitting orthotic device for improved functional performance.  (46151)    Service Based Modalities:      Timed Code Treatment Minutes:  25 therapeutic activity 30 manual therapy    Total Treatment Minutes:  55    Treatment/Activity Tolerance:  [x] Patient tolerated treatment well [] Patient limited by fatique  [] Patient limited by pain  [] Patient limited by other medical complications  [] Other:     Prognosis: [x] Good [] Fair  [] Poor    Patient Requires Follow-up: [x] Yes  [] No      Goals:  Short term goals  Time Frame for Short term goals: 2 weeks  Short term goal 1: Adjust splint as needed  Short term goal 2: Provide scar/edema management and techniques and patient education for improved ROM of L hand  Long term goals  Time Frame for Long term goals : 4 weeks  Long term goal 1: Patient will report decreased pain of < 1/10 with use and movement  Long term goal 2: Patient will demonstrated improved ROM of L hand by being able to make a full fist for picking up objects  Long term goal 3: Update goals as protocol allows patient to progress    Plan:   [x] Continue per plan of care [] Alter current plan (see comments)  [] Plan of care initiated [] Hold pending MD visit [] Discharge    Plan for Next Session:      Electronically signed by:  SCOTT Lake, OTR/L

## 2021-08-24 ENCOUNTER — HOSPITAL ENCOUNTER (OUTPATIENT)
Dept: OCCUPATIONAL THERAPY | Age: 41
Setting detail: THERAPIES SERIES
Discharge: HOME | End: 2021-08-24
Payer: COMMERCIAL

## 2021-08-24 PROCEDURE — 97140 MANUAL THERAPY 1/> REGIONS: CPT | Performed by: OCCUPATIONAL THERAPIST

## 2021-08-24 PROCEDURE — 97530 THERAPEUTIC ACTIVITIES: CPT | Performed by: OCCUPATIONAL THERAPIST

## 2021-08-24 NOTE — FLOWSHEET NOTE
[facility]    [x] [place]  Phone: [phone]  Fax: [phone]      [] [place]  Phone: [phone]  Fax: [phone]    Occupational Therapy Daily Treatment Note  Date:  2021    Patient Name:  Keerthi Peters    :  1980  MRN: [id number]  Restrictions/Precautions:      Medical/Treatment Diagnosis Information:   Diagnosis: s/p flexor tendon repair L hand      Insurance/Certification information:   Physician Information:   Referring Practitioner: Walt Nail of care signed (Y/N):  y    Visit# / total visits:      Pain level: 3/10      Progress Note: []  Yes  [x]  No  Next due by: Visit #10      Date of evaluation/re-evaluation: 2021-9/3/2021    Time In: 1133  Time Out:  1200    Subjective:   Patient rec'd in waiting room, pleasant and cooperative 46 yr old female with L hand laceration, s/p flexor tendon repair on 2021. Objective/Assessment:   1:1 manual therapy provided for increased ROM of L hand  PROM applied (see flow sheet for details)  AROM initiated this date, see below for details.    9HPT: R hand 26s  L hand 165s (2min 45s)    Exercises:   Exercise/Equipment Resistance/Repetitions Other comments   PROM:  MP flexion  PIP flexion  DIP flexion  Composite fist  Wrist flexion with fingers extended   25x  25x  25x  25x  25x (Isolated each finger)   Passive Finger Flexion with Active Wrist Extension 25x    Passive Wrist Flexion with Active Finger Flexion 25x    AROM:  Wrist flexion with fingers extended and wrist extension with fingers flexed  Thumb palmar abduction  Thumb opposition  Finger ab/adduction   20x        20x    20x  20x              Resist pins Yellow             20 lg puffballs Modified schmitt grasp (fingers extended/opposing thumb)               Therapeutic Exercise  [] Provided verbal/tactile cueing for activities related to strengthening, flexibility, endurance, ROM. (09072)  Neuro  Re-Ed  [] Provided verbal/tactile cueing for activities related to improving balance, coordination, kinesthetic sense, posture, motor skill, proprioception. (51484)     Therapeutic Activities/ADL:   [x] Provided use of dynamic activities to improve functional performance (61007)  [] Provided self-care/home management training for activities of daily living and compensatory training (23219)     Manual Treatments:   [x] Provided manual therapy to mobilize soft tissue/joints for the purpose of modulating pain, promoting relaxation, increasing ROM, reducing/eliminating soft tissue swelling/inflammation/restriction, improving soft tissue extensibility. (26831)     Orthotic Management:   [x] Provided assessment and fitting orthotic device for improved functional performance.  (28428)    Service Based Modalities:      Timed Code Treatment Minutes:  17 therapeutic activity 10 manual therapy    Total Treatment Minutes:  27    Treatment/Activity Tolerance:  [x] Patient tolerated treatment well [] Patient limited by fatique  [] Patient limited by pain  [] Patient limited by other medical complications  [] Other:     Prognosis: [x] Good [] Fair  [] Poor    Patient Requires Follow-up: [x] Yes  [] No      Goals:  Short term goals  Time Frame for Short term goals: 2 weeks  Short term goal 1: Adjust splint as needed  Short term goal 2: Provide scar/edema management and techniques and patient education for improved ROM of L hand  Long term goals  Time Frame for Long term goals : 4 weeks  Long term goal 1: Patient will report decreased pain of < 1/10 with use and movement  Long term goal 2: Patient will demonstrated improved ROM of L hand by being able to make a full fist for picking up objects  Long term goal 3: Update goals as protocol allows patient to progress    Plan:   [x] Continue per plan of care [] Alter current plan (see comments)  [] Plan of care initiated [] Hold pending MD visit [] Discharge    Plan for Next Session:      Electronically signed by:  Binh Faustin, OTMEGHA, OTR/L

## 2021-08-26 ENCOUNTER — HOSPITAL ENCOUNTER (OUTPATIENT)
Dept: OCCUPATIONAL THERAPY | Age: 41
Setting detail: THERAPIES SERIES
Discharge: HOME | End: 2021-08-26
Payer: COMMERCIAL

## 2021-08-26 PROCEDURE — 97140 MANUAL THERAPY 1/> REGIONS: CPT | Performed by: OCCUPATIONAL THERAPIST

## 2021-08-26 PROCEDURE — 97530 THERAPEUTIC ACTIVITIES: CPT | Performed by: OCCUPATIONAL THERAPIST

## 2021-08-26 NOTE — FLOWSHEET NOTE
[facility]    [x] [place]  Phone: [phone]  Fax: [phone]      [] [place]  Phone: [phone]  Fax: [phone]    Occupational Therapy Daily Treatment Note  Date:  2021    Patient Name:  Ayad Argueta    :  1980  MRN: [id number]  Restrictions/Precautions:      Medical/Treatment Diagnosis Information:   Diagnosis: s/p flexor tendon repair L hand      Insurance/Certification information:   Physician Information:   Referring Practitioner: Tanya Olivares of care signed (Y/N):  y    Visit# / total visits:  15/18    Pain level: 3/10      Progress Note: []  Yes  [x]  No  Next due by: Visit #10      Date of evaluation/re-evaluation: 2021-9/3/2021    Time In: 4065  Time Out: 1130     Subjective:   Patient rec'd in waiting room, pleasant and cooperative 46 yr old female with L hand laceration, s/p flexor tendon repair on 2021. Objective/Assessment:   1:1 manual therapy provided for increased ROM of L hand  Scar massage and elastomer fabricated for index finger for scar management. PROM see flow sheet for details  AROM see flowsheet for details.    21 9HPT: R hand 26s  L hand 165s (2min 45s)    Exercises:   Exercise/Equipment Resistance/Repetitions Other comments   PROM:  MP flexion  PIP flexion  DIP flexion  Composite fist  Wrist flexion with fingers extended   25x  25x  25x  25x  25x (Isolated each finger)   Passive Finger Flexion with Active Wrist Extension 25x    Passive Wrist Flexion with Active Finger Flexion 25x    AROM:  Wrist flexion with fingers extended and wrist extension with fingers flexed  Thumb palmar abduction  Thumb opposition  Finger ab/adduction   20x        20x    20x  20x              Resist pins Yellow        25 lg/md puffballs Modified schmitt grasp (fingers extended/opposing thumb)               Therapeutic Exercise  [] Provided verbal/tactile cueing for activities related to strengthening, flexibility, endurance, ROM. (16047)  Neuro  Re-Ed  [] Provided verbal/tactile cueing for activities related to improving balance, coordination, kinesthetic sense, posture, motor skill, proprioception. (41871)     Therapeutic Activities/ADL:   [x] Provided use of dynamic activities to improve functional performance (63323)  [] Provided self-care/home management training for activities of daily living and compensatory training (38793)     Manual Treatments:   [x] Provided manual therapy to mobilize soft tissue/joints for the purpose of modulating pain, promoting relaxation, increasing ROM, reducing/eliminating soft tissue swelling/inflammation/restriction, improving soft tissue extensibility. (22000)     Orthotic Management:   [x] Provided assessment and fitting orthotic device for improved functional performance.  (75602)    Service Based Modalities:      Timed Code Treatment Minutes:  17 therapeutic activity 10 manual therapy    Total Treatment Minutes:  27    Treatment/Activity Tolerance:  [x] Patient tolerated treatment well [] Patient limited by fatique  [] Patient limited by pain  [] Patient limited by other medical complications  [] Other:     Prognosis: [x] Good [] Fair  [] Poor    Patient Requires Follow-up: [x] Yes  [] No      Goals:  Short term goals  Time Frame for Short term goals: 2 weeks  Short term goal 1: Adjust splint as needed  Short term goal 2: Provide scar/edema management and techniques and patient education for improved ROM of L hand  Long term goals  Time Frame for Long term goals : 4 weeks  Long term goal 1: Patient will report decreased pain of < 1/10 with use and movement  Long term goal 2: Patient will demonstrated improved ROM of L hand by being able to make a full fist for picking up objects  Long term goal 3: Update goals as protocol allows patient to progress    Plan:   [x] Continue per plan of care [] Alter current plan (see comments)  [] Plan of care initiated [] Hold pending MD visit [] Discharge    Plan for Next Session:      Electronically signed by:  [address], OTD, OTR/L

## 2021-09-01 ENCOUNTER — HOSPITAL ENCOUNTER (OUTPATIENT)
Dept: OCCUPATIONAL THERAPY | Age: 41
Setting detail: THERAPIES SERIES
Discharge: HOME | End: 2021-09-01
Payer: COMMERCIAL

## 2021-09-01 ENCOUNTER — OFFICE VISIT (OUTPATIENT)
Dept: ORTHOPEDIC SURGERY | Age: 41
End: 2021-09-01
Payer: COMMERCIAL

## 2021-09-01 VITALS
HEIGHT: 60 IN | BODY MASS INDEX: 34.55 KG/M2 | DIASTOLIC BLOOD PRESSURE: 84 MMHG | WEIGHT: 176 LBS | HEART RATE: 64 BPM | SYSTOLIC BLOOD PRESSURE: 118 MMHG

## 2021-09-01 DIAGNOSIS — Z09 POSTOP CHECK: ICD-10-CM

## 2021-09-01 DIAGNOSIS — S66.802S: Primary | ICD-10-CM

## 2021-09-01 PROCEDURE — 97140 MANUAL THERAPY 1/> REGIONS: CPT

## 2021-09-01 PROCEDURE — 97530 THERAPEUTIC ACTIVITIES: CPT

## 2021-09-01 PROCEDURE — 99024 POSTOP FOLLOW-UP VISIT: CPT | Performed by: ORTHOPAEDIC SURGERY

## 2021-09-01 RX ORDER — IBUPROFEN 800 MG/1
800 TABLET ORAL PRN
COMMUNITY

## 2021-09-01 NOTE — FLOWSHEET NOTE
[facility]    [x] [place]  Phone: [phone]  Fax: [phone]      [] [place]  Phone: [phone]  Fax: [phone]    Occupational Therapy Daily Treatment Note  Date:  2021    Patient Name:  Ayad Argueta    :  1980  MRN: [id number]  Restrictions/Precautions:      Medical/Treatment Diagnosis Information:   Diagnosis: s/p flexor tendon repair L hand      Insurance/Certification information:   Physician Information:   Referring Practitioner: Tanya Olivares of care signed (Y/N):  y    Visit# / total visits:      Pain level: 3/10      Progress Note: []  Yes  [x]  No  Next due by: Visit #10      Date of evaluation/re-evaluation: 2021-9/3/2021    Time In: 855  Time Out: 942    Subjective:   Patient rec'd in waiting room, pleasant and cooperative 46 yr old female with L hand laceration, s/p flexor tendon repair on 2021. Objective/Assessment:   1:1 manual therapy and therapeutic activity provided for increased ROM of L hand  Scar massage throughout L hand/digits for scar management.   PROM see flow sheet for details  AROM see flowsheet for details  Initiated PIP and DIP blocking exercises for digits 2-5; see flow sheet for details    Exercises:   Exercise/Equipment Resistance/Repetitions Other comments   PROM:  MP flexion  PIP flexion  DIP flexion  Composite fist  Wrist flexion with fingers extended   25x  25x  25x  25x  25x (Isolated each finger)   Passive Finger Flexion with Active Wrist Extension 25x    Passive Wrist Flexion with Active Finger Flexion 25x    AROM:  Wrist flexion with fingers extended and wrist extension with fingers flexed  Thumb palmar abduction  Thumb opposition  Finger ab/adduction   20x        20x    20x  20x    Isolated Blocking Ex's 20x PIP digits 2-5, DIP digits 2-4   FMC x Card play (picking up, turning, and placing on table)   Resist pins Yellow        25 lg/md puffballs Modified schmitt grasp (fingers extended/opposing thumb)               Therapeutic Exercise  [] Provided verbal/tactile cueing for activities related to strengthening, flexibility, endurance, ROM. (85474)  Neuro  Re-Ed  [] Provided verbal/tactile cueing for activities related to improving balance, coordination, kinesthetic sense, posture, motor skill, proprioception. (29895)     Therapeutic Activities/ADL:   [x] Provided use of dynamic activities to improve functional performance (04467)  [] Provided self-care/home management training for activities of daily living and compensatory training (38167)     Manual Treatments:   [x] Provided manual therapy to mobilize soft tissue/joints for the purpose of modulating pain, promoting relaxation, increasing ROM, reducing/eliminating soft tissue swelling/inflammation/restriction, improving soft tissue extensibility. (47767)     Orthotic Management:   [x] Provided assessment and fitting orthotic device for improved functional performance.  (36111)    Service Based Modalities:      Timed Code Treatment Minutes:  30 therapeutic activity 17 manual therapy    Total Treatment Minutes:  47    Treatment/Activity Tolerance:  [x] Patient tolerated treatment well [] Patient limited by fatique  [] Patient limited by pain  [] Patient limited by other medical complications  [] Other:     Prognosis: [x] Good [] Fair  [] Poor    Patient Requires Follow-up: [x] Yes  [] No      Goals:  Short term goals  Time Frame for Short term goals: 2 weeks  Short term goal 1: Adjust splint as needed  Short term goal 2: Provide scar/edema management and techniques and patient education for improved ROM of L hand  Long term goals  Time Frame for Long term goals : 4 weeks  Long term goal 1: Patient will report decreased pain of < 1/10 with use and movement  Long term goal 2: Patient will demonstrated improved ROM of L hand by being able to make a full fist for picking up objects  Long term goal 3: Update goals as protocol allows patient to progress    Plan:   [x] Continue per plan of care [] Alter current plan (see comments)  [] Plan of care initiated [] Hold pending MD visit [] Discharge    Plan for Next Session:      Electronically signed by:  YIMI Osman

## 2021-09-01 NOTE — LETTER
[address] of [place] [address]  Phone: [phone]  Fax: [phone]    Erik Yates MD        September 1, 2021     Patient: Sammi Piña   YOB: 1980   Date of Visit: 9/1/2021       To Whom It May Concern: It is my medical opinion that Sammi Piña may return to light duty immediately with the following restrictions: no work involving left arm. If you have any questions or concerns, please don't hesitate to call.     Sincerely,        Erik Yates MD

## 2021-09-03 ENCOUNTER — HOSPITAL ENCOUNTER (OUTPATIENT)
Dept: OCCUPATIONAL THERAPY | Age: 41
Setting detail: THERAPIES SERIES
Discharge: HOME | End: 2021-09-03
Payer: COMMERCIAL

## 2021-09-03 PROCEDURE — 97140 MANUAL THERAPY 1/> REGIONS: CPT

## 2021-09-03 PROCEDURE — 97530 THERAPEUTIC ACTIVITIES: CPT

## 2021-09-03 NOTE — FLOWSHEET NOTE
[facility]    [x] [place]  Phone: [phone]  Fax: [phone]      [] [place]  Phone: [phone]  Fax: [phone]    Occupational Therapy Daily Treatment Note  Date:  9/3/2021    Patient Name:  Haroon Arboleda    :  1980  MRN: [id number]  Restrictions/Precautions:      Medical/Treatment Diagnosis Information:   Diagnosis: s/p flexor tendon repair L hand      Insurance/Certification information:   Physician Information:   Referring Practitioner: Shania Mackenzie of care signed (Y/N):  y    Visit# / total visits:      Pain level: 0/10      Progress Note: []  Yes  [x]  No  Next due by: Visit #10      Date of evaluation/re-evaluation: 9/3/2021-10/1/2021    Time In: 7777  Time Out: 1122    Subjective:   Patient rec'd in waiting room, pleasant and cooperative 46 yr old female with L hand laceration, s/p flexor tendon repair on 2021. Pt reports that she saw the doctor yesterday and he told her that she will get the button on the index finger of her left hand removed in 2-3 weeks. Dr. Clifford Moya educated patient to put Vitamin E on the scars to help soften it up. Objective/Assessment:   1:1 manual therapy and therapeutic activity provided for increased ROM of L hand  Scar massage throughout L hand/digits for scar management. PROM see flow sheet for details  AROM see flowsheet for details    9HPT: R hand 23s  L hand 66s (1min 6s)    AROM measurements:  R wrist flexion: 76°  R wrist extension: 80°  R forearm supination: 85°  L wrist flexion: 74°  L wrist extension: 72°  L forearm supination: 90°    Re-assessment completed this date with POC updated. Patient to continue with therapy for 3x/week for 4 weeks to improve ROM and [address]. Strengthening to be addressed as protocol allows (8 weeks post-op, 2021).     Exercises:   Exercise/Equipment Resistance/Repetitions Other comments   PROM:  MP flexion  PIP flexion  DIP flexion  Composite fist  Wrist flexion with fingers extended   25x  25x  25x  25x  25x (Isolated each finger)   Passive Finger Flexion with Active Wrist Extension 25x    Passive Wrist Flexion with Active Finger Flexion 25x    AROM:  Wrist flexion with fingers extended and wrist extension with fingers flexed  Thumb palmar abduction  Thumb opposition  Finger ab/adduction   20x        20x    20x  20x    Isolated Blocking Ex's 20x PIP digits 2-5, DIP digits 2-4   FMC x 9HPT, see above for details               Therapeutic Exercise  [] Provided verbal/tactile cueing for activities related to strengthening, flexibility, endurance, ROM. (63113)  Neuro  Re-Ed  [] Provided verbal/tactile cueing for activities related to improving balance, coordination, kinesthetic sense, posture, motor skill, proprioception. (66189)     Therapeutic Activities/ADL:   [x] Provided use of dynamic activities to improve functional performance (01314)  [] Provided self-care/home management training for activities of daily living and compensatory training (97377)     Manual Treatments:   [x] Provided manual therapy to mobilize soft tissue/joints for the purpose of modulating pain, promoting relaxation, increasing ROM, reducing/eliminating soft tissue swelling/inflammation/restriction, improving soft tissue extensibility. (17040)     Orthotic Management:   [x] Provided assessment and fitting orthotic device for improved functional performance.  (48126)    Service Based Modalities:      Timed Code Treatment Minutes:  30 therapeutic activity 12 manual therapy    Total Treatment Minutes:  42    Treatment/Activity Tolerance:  [x] Patient tolerated treatment well [] Patient limited by fatique  [] Patient limited by pain  [] Patient limited by other medical complications  [] Other:     Prognosis: [x] Good [] Fair  [] Poor    Patient Requires Follow-up: [x] Yes  [] No      Goals:  Short term goals  Time Frame for Short term goals: 2 weeks  Short term goal 1: Adjust splint as needed -GOAL MET  Short term goal 2: Provide scar/edema management and techniques and patient education for improved ROM of L hand -GOAL ON-GOING  Long term goals  Time Frame for Long term goals : 4 weeks  Long term goal 1: Patient will report decreased pain of < 1/10 with use and movement -GOAL MET  Long term goal 2: Patient will demonstrated improved ROM of L hand by being able to make a full fist for picking up objects -PROGRESSING/CONTINUE  Long term goal 3: Update goals as protocol allows patient to progress    Short term goals (updated 9/3/2021)  Time Frame for Short term goals: 2 weeks  Short term goal 1: Provide scar/edema management and techniques and patient education for improved ROM of L hand  Long term goals  Short term goal 2: Take  strength measurements week of 9/20/2021 as protocol allows strengthening to begin  Time Frame for Long term goals : 4 weeks  Long term goal 1: Patient will demonstrated improved ROM of L hand by being able to make a full fist for picking up objects  Long term goal 2: Patient will improve [address] of left hand with score on 9 HPT < 25 seconds  Long term goal 3: Patient will increase  strength (taken week of 9/20/2021) by > 15# for improved ability to lift and carry objects  Long term goal 4: Update goals as protocol allows patient to progress    Plan:   [x] Continue per plan of care [] Alter current plan (see comments)  [] Plan of care initiated [] Hold pending MD visit [] Discharge    Plan for Next Session:      Electronically signed by:  SCOTT Holland, OTR/L

## 2021-09-03 NOTE — PLAN OF CARE
Occupational Therapy    [x] [place]  Phone: [phone]  Fax: [phone]      [] [place]  Phone: [phone]  Fax: [phone]       To:   Referring Practitioner: Leonard Johnston      Patient: Matteo Lucia  : 1980  MRN: [id number]  Evaluation Date: 9/3/2021      Diagnosis Information:   Diagnosis: s/p flexor tendon repair L hand               Occupational Therapy Certification/Re-Certification Form  Dear Dr. Adriano Ding,   The following patient has been evaluated for occupational therapy services and for therapy to continue, insurance requires physician review of the treatment plan. Please review the attached evaluation and/or summary of the patient's plan of care, and verify that you agree therapy should continue by signing the attached document and sending it back to our office.     Plan of Care/Treatment to date: 9/3/2021-10/1/2021  [x] Therapeutic Exercise   [] Modalities:  [x] Therapeutic Activity    [x] Ultrasound  [] Electrical Stimulation   [x] Activities of Daily Living    [x] Paraffin   [] Kinesiotaping  [x] Neuromuscular Re-education   [] Iontophoresis [x] Coldpack/hotpack   [x] Instruction in HEP     [x] Orthotics/splint  [x] Manual Therapy     Other:  [] Aquatic Therapy      []       Frequency/Duration:   # Days per week: [] 1 day # Weeks: [] 1 week [] 5 weeks      [] 2 days   [] 2 weeks [] 6 weeks     [x] 3 days   [] 3 weeks [] 7 weeks     [] 4 days   [x] 4 weeks [] 8 weeks  Goals:  Short term goals (updated 9/3/2021)  Time Frame for Short term goals: 2 weeks  Short term goal 1: Provide scar/edema management and techniques and patient education for improved ROM of L hand  Long term goals  Short term goal 2: Take  strength measurements week of 2021 as protocol allows strengthening to begin  Time Frame for Long term goals : 4 weeks  Long term goal 1: Patient will demonstrated improved ROM of L hand by being able to make a full fist for picking up objects  Long term goal 2: Patient will improve [address] of left hand with score on 9 HPT < 25 seconds  Long term goal 3: Patient will increase  strength (taken week of 9/20/2021) by > 15# for improved ability to lift and carry objects  Long term goal 4: Update goals as protocol allows patient to progress    Rehab Potential: [] excellent [x] good [] fair  [] poor     Electronically signed by:  SCOTT Angeles, OTR/L       If you have any questions or concerns, please don't hesitate to call.   Thank you for your referral.      Physician Signature:________________________________Date:__________________  By signing above, therapists plan is approved by physician

## 2021-09-09 ENCOUNTER — HOSPITAL ENCOUNTER (OUTPATIENT)
Dept: OCCUPATIONAL THERAPY | Age: 41
Setting detail: THERAPIES SERIES
Discharge: HOME | End: 2021-09-09
Payer: COMMERCIAL

## 2021-09-09 PROCEDURE — 97035 APP MDLTY 1+ULTRASOUND EA 15: CPT

## 2021-09-09 PROCEDURE — 97140 MANUAL THERAPY 1/> REGIONS: CPT

## 2021-09-09 PROCEDURE — 97530 THERAPEUTIC ACTIVITIES: CPT

## 2021-09-09 NOTE — FLOWSHEET NOTE
[facility]    [x] [place]  Phone: [phone]  Fax: [phone]      [] [place]  Phone: [phone]  Fax: [phone]    Occupational Therapy Daily Treatment Note  Date:  2021    Patient Name:  Cori Meade    :  1980  MRN: [id number]  Restrictions/Precautions:      Medical/Treatment Diagnosis Information:   Diagnosis: s/p flexor tendon repair L hand      Insurance/Certification information:   Physician Information:   Referring Practitioner: Mario Alberto Martin of care signed (Y/N):  y    Visit# 25 / total visits:      Pain level: 0/10      Progress Note: []  Yes  [x]  No  Next due by: Visit #10      Date of evaluation/re-evaluation: 9/3/2021-10/1/2021    Time In: 1000  Time Out: 1045    Subjective:   Patient rec'd in waiting room, pleasant and cooperative 46 yr old female with L hand laceration, s/p flexor tendon repair on 2021. Pt reports having \"sore spot\" at medial side of digit 2 PIP; small blister present upon observation. Educated patient on cleansing and sterilizing area and avoiding scar massage and rubbing to area.     Objective/Assessment:   1:1 manual therapy and therapeutic activity provided for increased ROM of L hand  U/S to (L) hand/digits 8 minutes 3.3mHz 0.8w/cm2  Scar massage throughout L hand/digits for scar management (avoided digit 2 PIP)  PROM see flow sheet for details  AROM see flow sheet for details    Exercises:   Exercise/Equipment Resistance/Repetitions Other comments   PROM:  MP flexion  PIP flexion  DIP flexion  Composite fist      Hold 15s     10x  Hold 15s     10x  Hold 15s     10x  Hold 15s     10x (Isolated each finger)   Passive Finger Flexion with Active Wrist Extension 25x At home   Passive Wrist Flexion with Active Finger Flexion 25x At home   AROM:  Wrist flexion with fingers extended and wrist extension with fingers flexed  Thumb palmar abduction  Thumb opposition  Finger ab/adduction   20x make a full fist for picking up objects -PROGRESSING/CONTINUE  Long term goal 3: Update goals as protocol allows patient to progress    Short term goals (updated 9/3/2021)  Time Frame for Short term goals: 2 weeks  Short term goal 1: Provide scar/edema management and techniques and patient education for improved ROM of L hand  Long term goals  Short term goal 2: Take  strength measurements week of 9/20/2021 as protocol allows strengthening to begin  Time Frame for Long term goals : 4 weeks  Long term goal 1: Patient will demonstrated improved ROM of L hand by being able to make a full fist for picking up objects  Long term goal 2: Patient will improve [facility] of left hand with score on 9 HPT < 25 seconds  Long term goal 3: Patient will increase  strength (taken week of 9/20/2021) by > 15# for improved ability to lift and carry objects  Long term goal 4: Update goals as protocol allows patient to progress    Plan:   [x] Continue per plan of care [] Alter current plan (see comments)  [] Plan of care initiated [] Hold pending MD visit [] Discharge    Plan for Next Session:      Electronically signed by:  YIMI Aburto

## 2021-09-10 ENCOUNTER — APPOINTMENT (OUTPATIENT)
Dept: OCCUPATIONAL THERAPY | Age: 41
End: 2021-09-10
Payer: COMMERCIAL

## 2021-09-13 ENCOUNTER — HOSPITAL ENCOUNTER (OUTPATIENT)
Dept: OCCUPATIONAL THERAPY | Age: 41
Setting detail: THERAPIES SERIES
Discharge: HOME | End: 2021-09-13
Payer: COMMERCIAL

## 2021-09-13 PROCEDURE — 97035 APP MDLTY 1+ULTRASOUND EA 15: CPT

## 2021-09-13 PROCEDURE — 97140 MANUAL THERAPY 1/> REGIONS: CPT

## 2021-09-13 PROCEDURE — 97530 THERAPEUTIC ACTIVITIES: CPT

## 2021-09-13 NOTE — FLOWSHEET NOTE
Therapeutic Exercise  [] Provided verbal/tactile cueing for activities related to strengthening, flexibility, endurance, ROM. (90136)  Neuro  Re-Ed  [] Provided verbal/tactile cueing for activities related to improving balance, coordination, kinesthetic sense, posture, motor skill, proprioception. (02943)     Therapeutic Activities/ADL:   [x] Provided use of dynamic activities to improve functional performance (11739)  [] Provided self-care/home management training for activities of daily living and compensatory training (60830)     Manual Treatments:   [x] Provided manual therapy to mobilize soft tissue/joints for the purpose of modulating pain, promoting relaxation, increasing ROM, reducing/eliminating soft tissue swelling/inflammation/restriction, improving soft tissue extensibility. (81635)     Orthotic Management:   [] Provided assessment and fitting orthotic device for improved functional performance.  (80944)    Service Based Modalities:      Timed Code Treatment Minutes:  0 DB  88 therapeutic activity 18 manual therapy    Total Treatment Minutes:  48    Treatment/Activity Tolerance:  [x] Patient tolerated treatment well [] Patient limited by fatique  [] Patient limited by pain  [] Patient limited by other medical complications  [] Other:     Prognosis: [x] Good [] Fair  [] Poor    Patient Requires Follow-up: [x] Yes  [] No      Goals:  Short term goals  Time Frame for Short term goals: 2 weeks  Short term goal 1: Adjust splint as needed -GOAL MET  Short term goal 2: Provide scar/edema management and techniques and patient education for improved ROM of L hand -GOAL ON-GOING  Long term goals  Time Frame for Long term goals : 4 weeks  Long term goal 1: Patient will report decreased pain of < 1/10 with use and movement -GOAL MET  Long term goal 2: Patient will demonstrated improved ROM of L hand by being able to make a full fist for picking up objects -PROGRESSING/CONTINUE  Long term goal 3: Update goals as protocol allows patient to progress    Short term goals (updated 9/3/2021)  Time Frame for Short term goals: 2 weeks  Short term goal 1: Provide scar/edema management and techniques and patient education for improved ROM of L hand  Long term goals  Short term goal 2: Take  strength measurements week of 9/20/2021 as protocol allows strengthening to begin  Time Frame for Long term goals : 4 weeks  Long term goal 1: Patient will demonstrated improved ROM of L hand by being able to make a full fist for picking up objects  Long term goal 2: Patient will improve [facility] of left hand with score on 9 HPT < 25 seconds  Long term goal 3: Patient will increase  strength (taken week of 9/20/2021) by > 15# for improved ability to lift and carry objects  Long term goal 4: Update goals as protocol allows patient to progress    Plan:   [x] Continue per plan of care [] Alter current plan (see comments)  [] Plan of care initiated [] Hold pending MD visit [] Discharge    Plan for Next Session:      Electronically signed by:  YIMI Aburto

## 2021-09-15 ENCOUNTER — OFFICE VISIT (OUTPATIENT)
Dept: ORTHOPEDIC SURGERY | Age: 41
End: 2021-09-15
Payer: COMMERCIAL

## 2021-09-15 ENCOUNTER — HOSPITAL ENCOUNTER (OUTPATIENT)
Dept: OCCUPATIONAL THERAPY | Age: 41
Setting detail: THERAPIES SERIES
Discharge: HOME | End: 2021-09-15
Payer: COMMERCIAL

## 2021-09-15 VITALS — BODY MASS INDEX: 34.55 KG/M2 | HEIGHT: 60 IN | WEIGHT: 176 LBS

## 2021-09-15 DIAGNOSIS — S66.802S: Primary | ICD-10-CM

## 2021-09-15 PROCEDURE — 97110 THERAPEUTIC EXERCISES: CPT

## 2021-09-15 PROCEDURE — 97140 MANUAL THERAPY 1/> REGIONS: CPT

## 2021-09-15 PROCEDURE — 97035 APP MDLTY 1+ULTRASOUND EA 15: CPT

## 2021-09-15 PROCEDURE — 99024 POSTOP FOLLOW-UP VISIT: CPT | Performed by: ORTHOPAEDIC SURGERY

## 2021-09-15 NOTE — FLOWSHEET NOTE
[facility]    [x] [place]  Phone: [phone]  Fax: [phone]      [] [place]  Phone: [phone]  Fax: [phone]    Occupational Therapy Daily Treatment Note  Date:  9/15/2021    Patient Name:  Azeem Coppola    :  1980  MRN: [id number]  Restrictions/Precautions:      Medical/Treatment Diagnosis Information:   Diagnosis: s/p flexor tendon repair L hand      Insurance/Certification information:   Physician Information:   Referring Practitioner: Hasmukh Lange of care signed (Y/N):  y    Visit# 21 / total visits:      Pain level: 0/10      Progress Note: []  Yes  [x]  No  Next due by: Visit #10      Date of evaluation/re-evaluation: 9/3/2021-10/1/2021    Time In: 1100  Time Out: 1140    Subjective:   Patient rec'd in waiting room, pleasant and cooperative 46 yr old female with L hand laceration, s/p flexor tendon repair on 2021. Patient reports having follow-up appointment with [facility] prior to OT treatment this date, button removed on index finger with small bandage covering index DIP.     Objective/Assessment:   1:1 manual therapy and therapeutic activity provided for increased ROM of L hand  U/S to (L) hand/digits 8 minutes 3.3mHz 0.8w/cm2  Scar massage throughout L hand/digits for scar management  PROM see flow sheet for details  AROM see flow sheet for details    Exercises:   Exercise/Equipment Resistance/Repetitions Other comments   PROM:  MP flexion  PIP flexion  DIP flexion  Composite fist      Hold 15s     10x  Hold 15s     10x  Hold 15s     10x  Hold 15s     10x (Isolated each finger)   Passive Finger Flexion with Active Wrist Extension 25x At home   Passive Wrist Flexion with Active Finger Flexion 25x At home   AROM:  Wrist flexion with fingers extended and wrist extension with fingers flexed  Thumb palmar abduction  Thumb opposition  Finger ab/adduction   20x         20x     20x  20x    Isolated Blocking Ex's 20x PIP digits 2-5, DIP digits 2-4   Resist pins Yellow        25 lg/md puffballs Modified schmitt grasp (fingers extended/opposing thumb)   Towel Crumple 5x           Therapeutic Exercise  [x] Provided verbal/tactile cueing for activities related to strengthening, flexibility, endurance, ROM. (22554)  Neuro  Re-Ed  [] Provided verbal/tactile cueing for activities related to improving balance, coordination, kinesthetic sense, posture, motor skill, proprioception. (73937)     Therapeutic Activities/ADL:   [] Provided use of dynamic activities to improve functional performance (26783)  [] Provided self-care/home management training for activities of daily living and compensatory training (51857)     Manual Treatments:   [x] Provided manual therapy to mobilize soft tissue/joints for the purpose of modulating pain, promoting relaxation, increasing ROM, reducing/eliminating soft tissue swelling/inflammation/restriction, improving soft tissue extensibility. (25555)     Orthotic Management:   [] Provided assessment and fitting orthotic device for improved functional performance.  (45805)    Service Based Modalities:      Timed Code Treatment Minutes:  8   14 therapeutic exercise 18 manual therapy    Total Treatment Minutes:  40    Treatment/Activity Tolerance:  [x] Patient tolerated treatment well [] Patient limited by fatique  [] Patient limited by pain  [] Patient limited by other medical complications  [] Other:     Prognosis: [x] Good [] Fair  [] Poor    Patient Requires Follow-up: [x] Yes  [] No      Goals:  Short term goals  Time Frame for Short term goals: 2 weeks  Short term goal 1: Adjust splint as needed -GOAL MET  Short term goal 2: Provide scar/edema management and techniques and patient education for improved ROM of L hand -GOAL ON-GOING  Long term goals  Time Frame for Long term goals : 4 weeks  Long term goal 1: Patient will report decreased pain of < 1/10 with use and movement -GOAL MET  Long term goal 2: Patient will demonstrated improved ROM of L hand by being able to make a full fist for picking up objects -PROGRESSING/CONTINUE  Long term goal 3: Update goals as protocol allows patient to progress    Short term goals (updated 9/3/2021)  Time Frame for Short term goals: 2 weeks  Short term goal 1: Provide scar/edema management and techniques and patient education for improved ROM of L hand  Long term goals  Short term goal 2: Take  strength measurements week of 9/20/2021 as protocol allows strengthening to begin  Time Frame for Long term goals : 4 weeks  Long term goal 1: Patient will demonstrated improved ROM of L hand by being able to make a full fist for picking up objects  Long term goal 2: Patient will improve [address] of left hand with score on 9 HPT < 25 seconds  Long term goal 3: Patient will increase  strength (taken week of 9/20/2021) by > 15# for improved ability to lift and carry objects  Long term goal 4: Update goals as protocol allows patient to progress    Plan:   [x] Continue per plan of care [] Alter current plan (see comments)  [] Plan of care initiated [] Hold pending MD visit [] Discharge    Plan for Next Session:      Electronically signed by:  YIMI Luevano

## 2021-09-15 NOTE — LETTER
[address] department of [place] [address]  Phone: [phone]  Fax: [phone]    Cindy Naranjo MD        September 15, 2021     Patient: Cori Meade   YOB: 1980   Date of Visit: 9/15/2021       To Whom It May Concern: It is my medical opinion that Cori Meade may return to light duty immediately with the following restrictions: no work involving left arm. Restrictions in effect until 10/01/21    If you have any questions or concerns, please don't hesitate to call.     Sincerely,        Cindy Naranjo MD

## 2021-09-17 ENCOUNTER — HOSPITAL ENCOUNTER (OUTPATIENT)
Dept: OCCUPATIONAL THERAPY | Age: 41
Setting detail: THERAPIES SERIES
Discharge: HOME | End: 2021-09-17
Payer: COMMERCIAL

## 2021-09-17 PROCEDURE — 97140 MANUAL THERAPY 1/> REGIONS: CPT

## 2021-09-17 PROCEDURE — 97035 APP MDLTY 1+ULTRASOUND EA 15: CPT

## 2021-09-17 NOTE — FLOWSHEET NOTE
[facility]    [x] [place]  Phone: [phone]  Fax: [phone]      [] [place]  Phone: [phone]  Fax: [phone]    Occupational Therapy Daily Treatment Note  Date:  2021    Patient Name:  Matteo Lucia    :  1980  MRN: [id number]  Restrictions/Precautions:      Medical/Treatment Diagnosis Information:   Diagnosis: s/p flexor tendon repair L hand      Insurance/Certification information:   Physician Information:   Referring Practitioner: Yoshi Aguirre of [facility] signed (Y/N):  y    Visit# 21 / total visits:      Pain level: 0/10      Progress Note: []  Yes  [x]  No  Next due by: Visit #10      Date of evaluation/re-evaluation: 9/3/2021-10/1/2021    Time In: 7832  Time Out: 1115    Subjective:   Patient rec'd in waiting room, pleasant and cooperative 46 yr old female with L hand laceration, s/p flexor tendon repair on 2021. Patient reports that when the button was removed from index finger, the doctor pulled the stitch and cut the stitch as close to her finger as he could. Pt presents with small part of stitch remaining on her finger. Objective/Assessment:   1:1 manual therapy and therapeutic activity provided for increased ROM of L hand  MHP applied at beginning of treatment this date for improved ROM  U/S to (L) hand/digits 8 minutes 3.3mHz 0.8w/cm2  Scar massage throughout L hand/digits for scar management  PROM see flow sheet for details  AROM see flow sheet for details    Patient eager to begin strengthening exercises next week (2021). Pt will be 8 weeks post-op and protocol allows strengthening to begin. Take  strength measurements of (B) UE for baseline.     Exercises:   Exercise/Equipment Resistance/Repetitions Other comments   PROM:  MP flexion  PIP flexion  DIP flexion  Composite fist      Hold 15s     10x  Hold 15s     10x  Hold 15s     10x  Hold 15s     10x (Isolated each finger)   Passive Finger Flexion with Active Wrist Extension 25x At home   Passive Wrist Flexion with Active Finger Flexion 25x At home   AROM:  Wrist flexion with fingers extended and wrist extension with fingers flexed  Thumb palmar abduction  Thumb opposition  Finger ab/adduction   20x         20x     20x  20x At home   Isolated Blocking Ex's 20x PIP digits 2-5, DIP digits 2-4          Therapeutic Exercise  [x] Provided verbal/tactile cueing for activities related to strengthening, flexibility, endurance, ROM. (78398)  Neuro  Re-Ed  [] Provided verbal/tactile cueing for activities related to improving balance, coordination, kinesthetic sense, posture, motor skill, proprioception. (56702)     Therapeutic Activities/ADL:   [] Provided use of dynamic activities to improve functional performance (93965)  [] Provided self-care/home management training for activities of daily living and compensatory training (11580)     Manual Treatments:   [x] Provided manual therapy to mobilize soft tissue/joints for the purpose of modulating pain, promoting relaxation, increasing ROM, reducing/eliminating soft tissue swelling/inflammation/restriction, improving soft tissue extensibility. (84830)     Orthotic Management:   [] Provided assessment and fitting orthotic device for improved functional performance.  (00147)    Service Based Modalities:  5 [facility]    Timed Code Treatment Minutes:  8 US  27 manual therapy    Total Treatment Minutes:  40    Treatment/Activity Tolerance:  [x] Patient tolerated treatment well [] Patient limited by fatique  [] Patient limited by pain  [] Patient limited by other medical complications  [] Other:     Prognosis: [x] Good [] Fair  [] Poor    Patient Requires Follow-up: [x] Yes  [] No      Goals:  Short term goals  Time Frame for Short term goals: 2 weeks  Short term goal 1: Adjust splint as needed -GOAL MET  Short term goal 2: Provide scar/edema management and techniques and patient education for improved ROM of L hand -GOAL ON-GOING  Long term goals  Time Frame for Long term goals : 4 weeks  Long term goal 1: Patient will report decreased pain of < 1/10 with use and movement -GOAL MET  Long term goal 2: Patient will demonstrated improved ROM of L hand by being able to make a full fist for picking up objects -PROGRESSING/CONTINUE  Long term goal 3: Update goals as protocol allows patient to progress    Short term goals (updated 9/3/2021)  Time Frame for Short term goals: 2 weeks  Short term goal 1: Provide scar/edema management and techniques and patient education for improved ROM of L hand  Long term goals  Short term goal 2: Take  strength measurements week of 9/20/2021 as protocol allows strengthening to begin  Time Frame for Long term goals : 4 weeks  Long term goal 1: Patient will demonstrated improved ROM of L hand by being able to make a full fist for picking up objects  Long term goal 2: Patient will improve [facility] of left hand with score on 9 HPT < 25 seconds  Long term goal 3: Patient will increase  strength (taken week of 9/20/2021) by > 15# for improved ability to lift and carry objects  Long term goal 4: Update goals as protocol allows patient to progress    Plan:   [x] Continue per plan of care [] Alter current plan (see comments)  [] Plan of care initiated [] Hold pending MD visit [] Discharge    Plan for Next Session:      Electronically signed by:  SCOTT Miller, OTR/L

## 2021-09-19 NOTE — PROGRESS NOTES
History and Physical    Patient's Name/Date of Birth: Matteo Lucia / 1980, 46 y.o. yo    Date: September 19, 2021     PCP: JOCELINE Fu CNP     History of Present Illness: This 42-year-old female is now seen 7 weeks status post repair flexor tendon injuries to her left index long ring and small finger flexor tendons, date of surgery 7/26/2021. Patient has had the index finger stabilized with a pullout button. She reports no interval injury. She has been undergoing passive flexion of the digist.    Family history negative    Past Medical History:   Diagnosis Date    Anemia, iron deficiency 2017    Anxiety     Depression     Hypothyroid     Meniere disease       Past Surgical History:   Procedure Laterality Date    BREAST LUMPECTOMY  25 years ago    DILATION AND CURETTAGE OF UTERUS  2018    TUBAL LIGATION        Allergies: Patient has no known allergies. Current Outpatient Medications   Medication Sig Dispense Refill    ibuprofen (ADVIL;MOTRIN) 800 MG tablet Take 800 mg by mouth as needed for Pain      Misc Natural Product Nasal (PONARIS) SOLN 1 spray by Nasal route 2 times daily 1 Bottle     loratadine (CLARITIN) 10 MG tablet Take 1 tablet by mouth daily 30 tablet 5     No current facility-administered medications for this visit. Social History     Tobacco Use    Smoking status: Current Every Day Smoker     Packs/day: 0.50     Start date: 2000    Smokeless tobacco: Never Used    Tobacco comment: Will contact PCP when ready to quit. Substance Use Topics    Alcohol use: No        Review of Systems:  Negative  Other than stated above in the HPI is negative      Physical exam:     General appearance: no acute distress  Head: NAD  Neck: supple  Lungs: No audible wheezing, respiratory rate normal  Heart: Perfusion to all extremeties  Extremities: Examination of her left hand revealed that there is no significant swelling.  With active flexion she was able to come within 2 cm of the distal crease and passive motion was full. She was assessed to be neurovascular intact. Assessment:  Patient is doing well following repair of left index long ring and small finger flexor tendons        Plan:  Pullout button was removed today. Patient was instructed to continue with occupational therapy for the next 4 weeks. She will be reassessed again at that time. No orders of the defined types were placed in this encounter.               286 Sherly Sal MD,MD 9/19/2021 at 10:36 AM

## 2021-09-20 ENCOUNTER — HOSPITAL ENCOUNTER (OUTPATIENT)
Dept: OCCUPATIONAL THERAPY | Age: 41
Setting detail: THERAPIES SERIES
Discharge: HOME | End: 2021-09-20
Payer: COMMERCIAL

## 2021-09-20 PROCEDURE — 97140 MANUAL THERAPY 1/> REGIONS: CPT | Performed by: OCCUPATIONAL THERAPIST

## 2021-09-20 PROCEDURE — 97110 THERAPEUTIC EXERCISES: CPT | Performed by: OCCUPATIONAL THERAPIST

## 2021-09-20 NOTE — FLOWSHEET NOTE
[facility]    [x] [place]  Phone: [phone]  Fax: [phone]      [] [place]  Phone: [phone]  Fax: [phone]    Occupational Therapy Daily Treatment Note  Date:  2021    Patient Name:  Michelle Viera    :  1980  MRN: [id number]  Restrictions/Precautions:      Medical/Treatment Diagnosis Information:   Diagnosis: s/p flexor tendon repair L hand      Insurance/Certification information:   Physician Information:   Referring Practitioner: Edith Valdez of care signed (Y/N):  y    Visit# 24 / total visits:      Pain level: 0/10      Progress Note: []  Yes  [x]  No  Next due by: Visit #10      Date of evaluation/re-evaluation: 9/3/2021-10/1/2021    Time In: 1130  Time Out: 1215    Subjective:   Patient rec'd in waiting room, pleasant and cooperative 46 yr old female with L hand laceration, s/p flexor tendon repair on 2021. Patient reports that when the button was removed from index finger, the doctor pulled the stitch and cut the stitch as close to her finger as he could. Pt presents with small part of stitch remaining on her finger.      Objective/Assessment:   1:1 manual therapy and therapeutic activity provided for increased ROM of L hand  New isotoner glove issued (small) for edema control  Scar massage throughout L hand/digits for scar management    LUE strength:   9#  (average 66#) lateral pinch 14#, tip pinch 7#, schmitt pinch 8#  L hand 9HPT 47s (average 18s)      Exercises:   Exercise/Equipment Resistance/Repetitions Other comments   PROM:  MP flexion  PIP flexion  DIP flexion  Composite fist      Hold 15s     10x  Hold 15s     10x  Hold 15s     10x  Hold 15s     10x (Isolated each finger)   Passive Finger Flexion with Active Wrist Extension 25x At home   Passive Wrist Flexion with Active Finger Flexion 25x At home   AROM:  Wrist flexion with fingers extended and wrist extension with fingers flexed  Thumb palmar abduction  Thumb opposition  Finger ab/adduction   20x         20x     20x  20x At home   Isolated Blocking Ex's 20x2 PIP digits 2-5, DIP digits 2-4             Flex bar Red                            10x2 \"n\" \"u\"   Hand exerciser 1 yellow 1 red band    10x2    Putty  Medium      Objects  Pincer grasp   Resist pins  Red  Puff balls   UBE Level 4          5 min      Therapeutic Exercise  [x] Provided verbal/tactile cueing for activities related to strengthening, flexibility, endurance, ROM. (66230)  Neuro  Re-Ed  [] Provided verbal/tactile cueing for activities related to improving balance, coordination, kinesthetic sense, posture, motor skill, proprioception. (02819)     Therapeutic Activities/ADL:   [] Provided use of dynamic activities to improve functional performance (98664)  [] Provided self-care/home management training for activities of daily living and compensatory training (05235)     Manual Treatments:   [x] Provided manual therapy to mobilize soft tissue/joints for the purpose of modulating pain, promoting relaxation, increasing ROM, reducing/eliminating soft tissue swelling/inflammation/restriction, improving soft tissue extensibility. (58654)     Orthotic Management:   [] Provided assessment and fitting orthotic device for improved functional performance.  (16353)    Service Based Modalities:      Timed Code Treatment Minutes:    15 manual therapy  30 therapeutic exercise    Total Treatment Minutes:  45    Treatment/Activity Tolerance:  [x] Patient tolerated treatment well [] Patient limited by fatique  [] Patient limited by pain  [] Patient limited by other medical complications  [] Other:     Prognosis: [x] Good [] Fair  [] Poor    Patient Requires Follow-up: [x] Yes  [] No      Goals:  Short term goals  Time Frame for Short term goals: 2 weeks  Short term goal 1: Adjust splint as needed -GOAL MET  Short term goal 2: Provide scar/edema management and techniques and patient education for improved ROM of L hand -GOAL ON-GOING  Long term goals  Time Frame for Long term goals : 4 weeks  Long term goal 1: Patient will report decreased pain of < 1/10 with use and movement -GOAL MET  Long term goal 2: Patient will demonstrated improved ROM of L hand by being able to make a full fist for picking up objects -PROGRESSING/CONTINUE  Long term goal 3: Update goals as protocol allows patient to progress    Short term goals (updated 9/3/2021)  Time Frame for Short term goals: 2 weeks  Short term goal 1: Provide scar/edema management and techniques and patient education for improved ROM of L hand  Long term goals  Short term goal 2: Take  strength measurements week of 9/20/2021 as protocol allows strengthening to begin  Time Frame for Long term goals : 4 weeks  Long term goal 1: Patient will demonstrated improved ROM of L hand by being able to make a full fist for picking up objects  Long term goal 2: Patient will improve [facility] of left hand with score on 9 HPT < 25 seconds  Long term goal 3: Patient will increase  strength (taken week of 9/20/2021) by > 15# for improved ability to lift and carry objects  Long term goal 4: Update goals as protocol allows patient to progress    Plan:   [x] Continue per plan of care [] Alter current plan (see comments)  [] Plan of care initiated [] Hold pending MD visit [] Discharge    Plan for Next Session:      Electronically signed by:  Juliana GAMBOA, OTMEGHA, OTR/L

## 2021-09-24 ENCOUNTER — HOSPITAL ENCOUNTER (OUTPATIENT)
Dept: OCCUPATIONAL THERAPY | Age: 41
Setting detail: THERAPIES SERIES
Discharge: HOME | End: 2021-09-24
Payer: COMMERCIAL

## 2021-12-03 NOTE — DISCHARGE SUMMARY
Outpatient Occupational Therapy    [x] [place]  Phone: [phone]  Fax: [phone]      [] [place]  Phone: [phone]  Fax: [phone]    Occupational Therapy Discharge Note  Date: 12/3/2021        Patient Name:  Uma Schrader    :  1980  MRN: [id number]  Restrictions/Precautions:      Medical/Treatment Diagnosis Information:   Diagnosis: s/p flexor tendon repair L hand      Insurance/Certification information:   Physician Information:   Referring Practitioner: Lonnie Beach of care signed (Y/N):  y     Visit# 24 / total visits:  21     Pain level:      0/10       Time Period for Report: 2021-2021  Cancels/No-shows to date:  1    Significant Findings At Last Visit/Comments:    Subjective:  Patient rec'd in waiting room, pleasant and cooperative 46 yr old female with L hand laceration, s/p flexor tendon repair on 2021. Objective:  1:1 manual therapy and therapeutic activity provided for increased ROM of L hand  New isotoner glove issued (small) for edema control  Scar massage throughout L hand/digits for scar management     LUE strength:   9#  (average 66#) lateral pinch 14#, tip pinch 7#, schmitt pinch 8#  L hand 9HPT 47s (average 18s)     Assessment:  NOTE: This patient did not return for the recommended follow-up after 2021. Due to no further follow-up visits, there is no further testing or assessment that can be made beyond that which is included in the OT daily note on 2021. This patient is past 30 days, and is considered non-compliant with OT and will be discharged from occupational therapy services.       Goals/Progress towards goals:    Short term goals (updated 9/3/2021)  Time Frame for Short term goals: 2 weeks  Short term goal 1: Provide scar/edema management and techniques and patient education for improved ROM of L hand  Long term goals  Short term goal 2: Take  strength measurements week of 2021 as protocol allows strengthening to begin  Time Frame for Long term goals : 4 weeks  Long term goal 1: Patient will demonstrated improved ROM of L hand by being able to make a full fist for picking up objects  Long term goal 2: Patient will improve [address] of left hand with score on 9 HPT < 25 seconds  Long term goal 3: Patient will increase  strength (taken week of 9/20/2021) by > 15# for improved ability to lift and carry objects  Long term goal 4: Update goals as protocol allows patient to progress    Discharge Prognosis: [] Excellent [x] Good [] Fair  [] Poor       Goal Status:  [] Achieved [x] Partially Achieved  [] Not Achieved     Patient Status:     [x] Patient now discharged              Electronically signed by:  SCOTT Pagan, BEE/WILLIAM

## 2022-02-15 NOTE — TELEPHONE ENCOUNTER
Patient wanting to make a np appointment. Currently sees Dr Reyes out of [facility] in [place]- states \"they don't see eye to eye\"  Back pain/ Crohn's/ Lf ankle pain  Requested records to be faxed for review    Per lab note, patient was notified

## 2022-03-28 ENCOUNTER — OFFICE VISIT (OUTPATIENT)
Dept: FAMILY MEDICINE CLINIC | Age: 42
End: 2022-03-28
Payer: COMMERCIAL

## 2022-03-28 VITALS
HEART RATE: 86 BPM | OXYGEN SATURATION: 98 % | SYSTOLIC BLOOD PRESSURE: 142 MMHG | BODY MASS INDEX: 34.29 KG/M2 | DIASTOLIC BLOOD PRESSURE: 88 MMHG | WEIGHT: 175.6 LBS

## 2022-03-28 DIAGNOSIS — E78.2 MIXED HYPERLIPIDEMIA: ICD-10-CM

## 2022-03-28 DIAGNOSIS — E03.9 HYPOTHYROIDISM, UNSPECIFIED TYPE: ICD-10-CM

## 2022-03-28 DIAGNOSIS — R53.83 FATIGUE, UNSPECIFIED TYPE: ICD-10-CM

## 2022-03-28 DIAGNOSIS — Z00.00 ENCOUNTER FOR WELLNESS EXAMINATION IN ADULT: Primary | ICD-10-CM

## 2022-03-28 DIAGNOSIS — K76.0 HEPATIC STEATOSIS: ICD-10-CM

## 2022-03-28 DIAGNOSIS — I10 PRIMARY HYPERTENSION: ICD-10-CM

## 2022-03-28 PROCEDURE — 99395 PREV VISIT EST AGE 18-39: CPT | Performed by: NURSE PRACTITIONER

## 2022-03-28 RX ORDER — LOSARTAN POTASSIUM 50 MG/1
50 TABLET ORAL DAILY
Qty: 30 TABLET | Refills: 2 | Status: SHIPPED | OUTPATIENT
Start: 2022-03-28 | End: 2022-04-11 | Stop reason: DRUGHIGH

## 2022-03-28 ASSESSMENT — ENCOUNTER SYMPTOMS
BLURRED VISION: 0
SHORTNESS OF BREATH: 1

## 2022-03-28 ASSESSMENT — PATIENT HEALTH QUESTIONNAIRE - PHQ9
2. FEELING DOWN, DEPRESSED OR HOPELESS: 0
SUM OF ALL RESPONSES TO PHQ9 QUESTIONS 1 & 2: 0
SUM OF ALL RESPONSES TO PHQ QUESTIONS 1-9: 0
SUM OF ALL RESPONSES TO PHQ QUESTIONS 1-9: 0
1. LITTLE INTEREST OR PLEASURE IN DOING THINGS: 0
SUM OF ALL RESPONSES TO PHQ QUESTIONS 1-9: 0
SUM OF ALL RESPONSES TO PHQ QUESTIONS 1-9: 0

## 2022-03-28 NOTE — PROGRESS NOTES
[address] [address] [address]  Dept: [phone]  Dept Fax: [phone]    History and Physical  Patient:  Sandy Romero  YOB: 1980  Date of Service:  3/28/2022    Subjective:   Sandy Romero (:  1980) is a 46 y.o. female, Established patient, here for evaluation of the following chief complaint(s):    Chief Complaint   Patient presents with    Hypertension     reports has been checking blood pressures at home says her readings are 140-170/ range     Annual Exam      She has been monitoring the blood pressure at home over the past 6 months and seems to be elevated. She takes ibuprofen every few days for headaches. Hypertension  This is a chronic problem. The current episode started more than 1 month ago. The problem has been waxing and waning since onset. The problem is uncontrolled. Associated symptoms include anxiety, headaches, malaise/fatigue and shortness of breath. Pertinent negatives include no blurred vision, chest pain, orthopnea, palpitations, peripheral edema, PND or sweats. Agents associated with hypertension include thyroid hormones and NSAIDs. Risk factors for coronary artery disease include smoking/tobacco exposure, family history, obesity, sedentary lifestyle and dyslipidemia. Past treatments include nothing. Compliance problems include exercise and diet. Identifiable causes of hypertension include a hypertension causing med and a thyroid problem. GYNECOLOGIC HISTORY:    Her last pap smear was completed on ? Her HPV status is not done. She denies signs of vaginitis. She has no other complaints today. No LMP recorded. Patient has had an ablation. Abnormal bleeding/discharge: No  Cramping: No  She does perform regular breast self exams. The ASCVD Risk score (Andressa Andrade et al., 2013) failed to calculate for the following reasons:     The 2013 ASCVD risk score is only valid for ages 36 to 78     BP Readings from Last 3 Encounters:   03/28/22 (!) 142/88   09/01/21 118/84   08/04/21 (!) 148/82      Pulse Readings from Last 3 Encounters:   03/28/22 86   09/01/21 64   07/21/21 79      Wt Readings from Last 3 Encounters:   03/28/22 175 lb 9.6 oz (79.7 kg)   09/15/21 176 lb (79.8 kg)   09/01/21 176 lb (79.8 kg)        No Known Allergies    Current Outpatient Medications   Medication Sig Dispense Refill    losartan (COZAAR) 50 MG tablet Take 1 tablet by mouth daily 30 tablet 2    ibuprofen (ADVIL;MOTRIN) 800 MG tablet Take 800 mg by mouth as needed for Pain      Misc Natural Product Nasal (PONARIS) SOLN 1 spray by Nasal route 2 times daily 1 Bottle     loratadine (CLARITIN) 10 MG tablet Take 1 tablet by mouth daily 30 tablet 5     No current facility-administered medications for this visit. Past Medical History:   Diagnosis Date    Anemia, iron deficiency 2017    Anxiety     Depression     Hypothyroid     Meniere disease        Past Surgical History:   Procedure Laterality Date    BREAST LUMPECTOMY  25 years ago    DILATION AND CURETTAGE OF UTERUS  2018    TUBAL LIGATION       Family History   Problem Relation Age of Onset    Cancer Mother         liver, stomach, colon    High Blood Pressure Mother     Other Father         fatty liver    Diabetes Father     Asthma Father     High Blood Pressure Father     Sleep Apnea Father     High Blood Pressure Half-Sister     Mental Illness Half-Sister     Anemia Half-Sister     Mental Illness Half-Sister         anxiety    Depression Half-Sister         and anxiety    Depression Half-Brother     High Blood Pressure Half-Brother      Social History     Tobacco Use    Smoking status: Current Every Day Smoker     Packs/day: 0.50     Start date: 2000    Smokeless tobacco: Never Used    Tobacco comment: Will contact PCP when ready to quit.     Vaping Use    Vaping Use: Never used   Substance Use Topics    Alcohol use: No    Drug use: No       Review of Systems:     Review of Systems   Constitutional: Positive for fatigue and malaise/fatigue. Negative for appetite change, chills and fever. HENT: Negative. Eyes: Negative. Negative for blurred vision. Respiratory: Positive for shortness of breath. Negative for cough and wheezing. Cardiovascular: Negative for chest pain, palpitations, orthopnea, leg swelling and PND. Gastrointestinal: Negative for abdominal pain, constipation and diarrhea. Endocrine: Negative for cold intolerance, heat intolerance, polydipsia, polyphagia and polyuria. Genitourinary: Positive for frequency and urgency. Negative for dysuria and hematuria. Musculoskeletal: Negative for arthralgias and myalgias. Skin: Negative. Allergic/Immunologic: Negative for environmental allergies and food allergies. Neurological: Positive for headaches. Negative for dizziness, weakness and light-headedness. Psychiatric/Behavioral: Positive for sleep disturbance (works 3rd shift. gets around 5 hours a day). Negative for agitation, dysphoric mood, self-injury and suicidal ideas. The patient is not nervous/anxious. PHQ Scores 3/28/2022 4/21/2021 3/25/2019   PHQ2 Score 0 0 0   PHQ9 Score 0 0 0     Interpretation of Total Score Depression Severity: 1-4 = Minimal depression, 5-9 = Mild depression, 10-14 = Moderate depression, 15-19 = Moderately severe depression, 20-27 = Severe depression     Physical Exam:     Vitals:    03/28/22 1118 03/28/22 1147   BP: (!) 144/104 (!) 142/88   Pulse: 86    SpO2: 98%    Weight: 175 lb 9.6 oz (79.7 kg)       Body mass index is 34.29 kg/m². Physical Exam  Constitutional:       Appearance: Normal appearance. She is well-developed and well-groomed. She is obese. HENT:      Head: Normocephalic. Eyes:      Conjunctiva/sclera: Conjunctivae normal.   Neck:      Thyroid: No thyromegaly. Cardiovascular:      Rate and Rhythm: Normal rate and regular rhythm.       Heart sounds: Normal heart sounds. Pulmonary:      Effort: Pulmonary effort is normal.      Breath sounds: Normal breath sounds. No wheezing. Abdominal:      General: Bowel sounds are normal.      Palpations: Abdomen is soft. Tenderness: There is no abdominal tenderness. Musculoskeletal:      Cervical back: Neck supple. Right lower leg: No edema. Left lower leg: No edema. Lymphadenopathy:      Cervical: No cervical adenopathy. Skin:     Capillary Refill: Capillary refill takes less than 2 seconds. Neurological:      Mental Status: She is alert and oriented to person, place, and time. Motor: Tremor (head) present. Gait: Gait normal.   Psychiatric:         Mood and Affect: Mood normal.         Behavior: Behavior is cooperative. Assessment/Plan:   1. Encounter for wellness examination in adult  -     TSH With Reflex Ft4; Future  -     Vitamin D 25 Hydroxy; Future  -     Hemoglobin A1C; Future  -     Lipid, Fasting; Future  -     Microalbumin, Ur; Future  -     Comprehensive Metabolic Panel; Future  -     CBC with Auto Differential; Future  2. Primary hypertension  Comments:  Start losartan 50 mg daily as discussed. Increase activity, increase fluids, and decrease salt in diet. Orders:  -     TSH With Reflex Ft4; Future  -     Lipid, Fasting; Future  -     Microalbumin, Ur; Future  -     Comprehensive Metabolic Panel; Future  -     CBC with Auto Differential; Future  -     losartan (COZAAR) 50 MG tablet; Take 1 tablet by mouth daily, Disp-30 tablet, R-2Normal  3. Mixed hyperlipidemia  -     Lipid, Fasting; Future  -     Comprehensive Metabolic Panel; Future  4. Fatigue, unspecified type  -     TSH With Reflex Ft4; Future  -     Vitamin D 25 Hydroxy; Future  -     Hemoglobin A1C; Future  -     Comprehensive Metabolic Panel; Future  -     CBC with Auto Differential; Future  5. Hypothyroidism, unspecified type  -     TSH With Reflex Ft4; Future  6.  Hepatic steatosis  -     Lipid, Fasting; Future  -     Comprehensive Metabolic Panel; Future      All patient questions answered. Patient voiced understanding. Instructed to continue current medications. Patient agreed with treatment plan. Follow up as directed. Return in about 2 weeks (around 4/11/2022) for HTN, Pap. Please note that this chart was generated using voice recognition Dragon dictation software. Although every effort was made to ensure the accuracy of this automated transcription, some errors in transcription may have occurred.     Electronically signed by JOCELINE Fu CNP on 4/1/2022

## 2022-03-28 NOTE — PATIENT INSTRUCTIONS
Patient Education        Low Sodium Diet (2,000 Milligram): Care Instructions  Overview     Limiting sodium can be an important part of managing some health problems. The most common source of sodium is salt. People get most of the salt in their diet from canned, prepared, and packaged foods. Fast food and restaurant meals also are very high in sodium. Your doctor will probably limit your sodium to less than 2,000 milligrams (mg) a day. This limit counts all the sodium in prepared and packaged foods and any salt you add to your food. Follow-up care is a key part of your treatment and safety. Be sure to make and go to all appointments, and call your doctor if you are having problems. It's also a good idea to know your test results and keep a list of the medicines you take. How can you care for yourself at home? Read food labels  · Read labels on cans and food packages. The labels tell you how much sodium is in each serving. Make sure that you look at the serving size. If you eat more than the serving size, you have eaten more sodium. · Food labels also tell you the Percent Daily Value for sodium. Choose products with low Percent Daily Values for sodium. · Be aware that sodium can come in forms other than salt, including monosodium glutamate (MSG), sodium citrate, and sodium bicarbonate (baking soda). MSG is often added to Asian food. When you eat out, you can sometimes ask for food without MSG or added salt. Buy low-sodium foods  · Buy foods that are labeled \"unsalted\" (no salt added), \"sodium-free\" (less than 5 mg of sodium per serving), or \"low-sodium\" (140 mg or less of sodium per serving). Foods labeled \"reduced-sodium\" and \"light sodium\" may still have too much sodium. Be sure to read the label to see how much sodium you are getting. · Buy fresh vegetables, or frozen vegetables without added sauces. Buy low-sodium versions of canned vegetables, soups, and other canned goods.   Prepare low-sodium meals  · Cut back on the amount of salt you use in cooking. This will help you adjust to the taste. Do not add salt after cooking. One teaspoon of salt has about 2,300 mg of sodium. · Take the salt shaker off the table. · Flavor your food with garlic, lemon juice, onion, vinegar, herbs, and spices. Do not use soy sauce, lite soy sauce, steak sauce, onion salt, garlic salt, celery salt, or ketchup on your food. · Use low-sodium salad dressings, sauces, and ketchup. Or make your own salad dressings and sauces without adding salt. · Use less salt (or none) when recipes call for it. You can often use half the salt a recipe calls for without losing flavor. Other foods such as rice, pasta, and grains do not need added salt. · Rinse canned vegetables, and cook them in fresh water. This removes some--but not all--of the salt. · Avoid water that is naturally high in sodium or that has been treated with water softeners, which add sodium. If you buy bottled water, read the label and choose a sodium-free brand. Avoid high-sodium foods  · Avoid eating:  ? Smoked, cured, salted, and canned meat, fish, and poultry. ? Ham, garsia, hot dogs, and luncheon meats. ? Regular, hard, and processed cheese and regular peanut butter. ? Crackers with salted tops, and other salted snack foods such as pretzels, chips, and salted popcorn. ? Frozen prepared meals, unless labeled low-sodium. ? Canned and dried soups, broths, and bouillon, unless labeled sodium-free or low-sodium. ? Canned vegetables, unless labeled sodium-free or low-sodium. ? [place] fries, pizza, tacos, and other fast foods. ? Pickles, olives, ketchup, and other condiments, especially soy sauce, unless labeled sodium-free or low-sodium. Where can you learn more? Go to https://[facility]son.[facility]. org and sign in to your [employer] account.  Enter O691 in the [facility] box to learn more about \"Low Sodium Diet (2,000 Milligram): Care Instructions. \"     If you do not have an account, please click on the \"Sign Up Now\" link. Current as of: September 8, 2021               Content Version: 13.1  © 2821-1209 [employer]orporated. Care instructions adapted under license by [facility] ([facility]). If you have questions about a medical condition or this instruction, always ask your healthcare professional. [address] any warranty or liability for your use of this information.

## 2022-04-01 PROBLEM — I10 PRIMARY HYPERTENSION: Status: ACTIVE | Noted: 2022-04-01

## 2022-04-01 PROBLEM — E03.9 HYPOTHYROIDISM: Status: ACTIVE | Noted: 2022-04-01

## 2022-04-01 PROBLEM — E78.2 MIXED HYPERLIPIDEMIA: Status: ACTIVE | Noted: 2022-04-01

## 2022-04-01 PROBLEM — R53.83 FATIGUE: Status: ACTIVE | Noted: 2022-04-01

## 2022-04-01 ASSESSMENT — ENCOUNTER SYMPTOMS
ABDOMINAL PAIN: 0
DIARRHEA: 0
COUGH: 0
CONSTIPATION: 0
EYES NEGATIVE: 1
WHEEZING: 0
ORTHOPNEA: 0

## 2022-04-04 LAB
ALBUMIN/GLOBULIN RATIO: 1.5 G/DL
ALBUMIN: 4.8 G/DL (ref 3.5–5)
ALP BLD-CCNC: 25 UNITS/L (ref 38–126)
ALT SERPL-CCNC: 39 UNITS/L (ref 4–35)
ANION GAP SERPL CALCULATED.3IONS-SCNC: 8.3 MMOL/L
AST SERPL-CCNC: 34 UNITS/L (ref 14–36)
BASOPHILS %: 1.95 (ref 0–3)
BASOPHILS ABSOLUTE: 0.21 (ref 0–0.3)
BILIRUB SERPL-MCNC: 0.7 MG/DL (ref 0.2–1.3)
BUN BLDV-MCNC: 11 MG/DL (ref 7–17)
CALCIUM SERPL-MCNC: 9.4 MG/DL (ref 8.4–10.2)
CHLORIDE BLD-SCNC: 100 MMOL/L (ref 98–120)
CHOLESTEROL/HDL RATIO: 4.91 RATIO (ref 0–4.5)
CHOLESTEROL: 211 MG/DL (ref 50–200)
CO2: 31 MMOL/L (ref 22–31)
CREAT SERPL-MCNC: 0.7 MG/DL (ref 0.5–1)
CREATININE, RANDOM URINE: 196.7 MG/DL (ref 20–370)
EOSINOPHILS %: 4.35 (ref 0–10)
EOSINOPHILS ABSOLUTE: 0.48 (ref 0–1.1)
GFR CALCULATED: > 60
GLOBULIN: 3.1 G/DL
GLUCOSE: 94 MG/DL (ref 65–105)
HBA1C MFR BLD: 5.4 % (ref 4.4–6.4)
HCT VFR BLD CALC: 47.1 % (ref 37–47)
HDLC SERPL-MCNC: 43 MG/DL (ref 36–68)
HEMOGLOBIN: 15.2 (ref 12–16)
LDL CHOLESTEROL CALCULATED: 143 MG/DL (ref 0–160)
LYMPHOCYTE %: 39.14 (ref 20–51.1)
LYMPHOCYTES ABSOLUTE: 4.28 (ref 1–5.5)
MCH RBC QN AUTO: 29.5 PG (ref 28.5–32.5)
MCHC RBC AUTO-ENTMCNC: 32.4 G/DL (ref 32–37)
MCV RBC AUTO: 91.3 FL (ref 80–94)
MICROALBUMIN UR-MCNC: 9.7 MG/DL (ref 0–1.7)
MICROALBUMIN/CREAT UR-RTO: 49.31
MONOCYTES %: 6.34 (ref 1.7–9.3)
MONOCYTES ABSOLUTE: 0.69 (ref 0.1–1)
NEUTROPHILS %: 48.22 (ref 42.2–75.2)
NEUTROPHILS ABSOLUTE: 5.27 (ref 2–8.1)
PDW BLD-RTO: 13 % (ref 8.5–15.5)
PLATELET # BLD: 223.7 THOU/MM3 (ref 130–400)
POTASSIUM SERPL-SCNC: 4.1 MMOL/L (ref 3.6–5)
RBC: 5.16 M/UL (ref 4.2–5.4)
SODIUM BLD-SCNC: 139 MMOL/L (ref 135–145)
T4 FREE: 0.63 NG/DL (ref 0.78–2.19)
TOTAL PROTEIN, SERUM: 7.9 G/DL (ref 6.3–8.2)
TRIGL SERPL-MCNC: 125 MG/DL (ref 10–250)
TSH REFLEX FT4: 9.85 MIU/ML (ref 0.49–4.67)
VITAMIN D 25-HYDROXY: 25 NG/ML (ref 30–100)
VLDLC SERPL CALC-MCNC: 25 MG/DL (ref 0–50)
WBC: 10.9 THOU/ML3 (ref 4.8–10.8)

## 2022-04-11 ENCOUNTER — OFFICE VISIT (OUTPATIENT)
Dept: FAMILY MEDICINE CLINIC | Age: 42
End: 2022-04-11
Payer: COMMERCIAL

## 2022-04-11 VITALS
BODY MASS INDEX: 34.92 KG/M2 | HEART RATE: 81 BPM | OXYGEN SATURATION: 98 % | WEIGHT: 178.8 LBS | DIASTOLIC BLOOD PRESSURE: 90 MMHG | SYSTOLIC BLOOD PRESSURE: 150 MMHG

## 2022-04-11 DIAGNOSIS — E55.9 VITAMIN D DEFICIENCY: ICD-10-CM

## 2022-04-11 DIAGNOSIS — E03.9 HYPOTHYROIDISM, UNSPECIFIED TYPE: ICD-10-CM

## 2022-04-11 DIAGNOSIS — I10 PRIMARY HYPERTENSION: ICD-10-CM

## 2022-04-11 DIAGNOSIS — K76.0 HEPATIC STEATOSIS: ICD-10-CM

## 2022-04-11 DIAGNOSIS — R53.83 FATIGUE, UNSPECIFIED TYPE: ICD-10-CM

## 2022-04-11 DIAGNOSIS — Z01.419 VISIT FOR GYNECOLOGIC EXAMINATION: Primary | ICD-10-CM

## 2022-04-11 DIAGNOSIS — E78.2 MIXED HYPERLIPIDEMIA: ICD-10-CM

## 2022-04-11 PROCEDURE — 99214 OFFICE O/P EST MOD 30 MIN: CPT | Performed by: NURSE PRACTITIONER

## 2022-04-11 RX ORDER — ERGOCALCIFEROL 1.25 MG/1
50000 CAPSULE ORAL WEEKLY
Qty: 12 CAPSULE | Refills: 0 | Status: SHIPPED | OUTPATIENT
Start: 2022-04-11 | End: 2022-09-12 | Stop reason: SDUPTHER

## 2022-04-11 RX ORDER — LEVOTHYROXINE SODIUM 0.05 MG/1
50 TABLET ORAL DAILY
Qty: 30 TABLET | Refills: 5 | Status: SHIPPED | OUTPATIENT
Start: 2022-04-11 | End: 2022-06-13 | Stop reason: ALTCHOICE

## 2022-04-11 RX ORDER — LOSARTAN POTASSIUM AND HYDROCHLOROTHIAZIDE 12.5; 5 MG/1; MG/1
1 TABLET ORAL DAILY
Qty: 30 TABLET | Refills: 5 | Status: SHIPPED | OUTPATIENT
Start: 2022-04-11

## 2022-04-11 NOTE — PROGRESS NOTES
[address] [address] [address]  Dept: [phone]  Dept Fax: [phone]    History and Physical  Patient:  Anson Chavarria  YOB: 1980  Date of Service:  2022    Subjective:   Anson Chavarria (:  1980) is a 46 y.o. female, Established patient, here for evaluation of the following chief complaint(s):    Chief Complaint   Patient presents with    Gynecologic Exam     pap    Hypertension     f/u has been monitoring at home and still elevated, denies any chest pains sob dizziness \"just feels like crap\"      Started on Losartan 50 mg daily 2 weeks ago. Blood pressure at home has been 135-150/100-114. Hypertension  This is a chronic problem. The current episode started more than 1 month ago. The problem has been waxing and waning since onset. The problem is uncontrolled. Associated symptoms include anxiety, headaches, malaise/fatigue and shortness of breath. Pertinent negatives include no blurred vision, chest pain, orthopnea, palpitations, peripheral edema, PND or sweats. Agents associated with hypertension include thyroid hormones and NSAIDs. Risk factors for coronary artery disease include smoking/tobacco exposure, family history, obesity, sedentary lifestyle and dyslipidemia. Past treatments include nothing. Compliance problems include exercise and diet. Identifiable causes of hypertension include a hypertension causing med and a thyroid problem.      GYNECOLOGIC HISTORY:    Her last pap smear was completed on ? Her HPV status is not done. She denies signs of vaginitis. She has no other complaints today. No LMP recorded. Patient has had an ablation. Abnormal bleeding/discharge: No  Cramping: No  She does perform regular breast self exams. The ASCVD Risk score (Robyn Hennessy, et al., 2013) failed to calculate for the following reasons:     The 2013 ASCVD risk score is only valid for ages 36 to 78     BP Readings from Last 3 Encounters:   04/11/22 (!) 150/90   03/28/22 (!) 142/88   09/01/21 118/84      Pulse Readings from Last 3 Encounters:   04/11/22 81   03/28/22 86   09/01/21 64      Wt Readings from Last 3 Encounters:   04/11/22 178 lb 12.8 oz (81.1 kg)   03/28/22 175 lb 9.6 oz (79.7 kg)   09/15/21 176 lb (79.8 kg)        No Known Allergies    Current Outpatient Medications   Medication Sig Dispense Refill    levothyroxine (SYNTHROID) 50 MCG tablet Take 1 tablet by mouth daily 30 tablet 5    vitamin D (ERGOCALCIFEROL) 1.25 MG (60751 UT) CAPS capsule Take 1 capsule by mouth once a week 12 capsule 0    losartan-hydroCHLOROthiazide (HYZAAR) 50-12.5 MG per tablet Take 1 tablet by mouth daily 30 tablet 5    ibuprofen (ADVIL;MOTRIN) 800 MG tablet Take 800 mg by mouth as needed for Pain      Misc Natural Product Nasal (PONARIS) SOLN 1 spray by Nasal route 2 times daily 1 Bottle     loratadine (CLARITIN) 10 MG tablet Take 1 tablet by mouth daily 30 tablet 5     No current facility-administered medications for this visit.         Past Medical History:   Diagnosis Date    Anemia, iron deficiency 2017    Anxiety     Depression     Hypothyroid     Meniere disease        Past Surgical History:   Procedure Laterality Date    BREAST LUMPECTOMY  25 years ago    DILATION AND CURETTAGE OF UTERUS  2018    TUBAL LIGATION       Family History   Problem Relation Age of Onset    Cancer Mother         liver, stomach, colon    High Blood Pressure Mother     Other Father         fatty liver    Diabetes Father     Asthma Father     High Blood Pressure Father     Sleep Apnea Father     High Blood Pressure Half-Sister     Mental Illness Half-Sister     Anemia Half-Sister     Mental Illness Half-Sister         anxiety    Depression Half-Sister         and anxiety    Depression Half-Brother     High Blood Pressure Half-Brother      Social History     Tobacco Use    Smoking status: Current Every Day Smoker     Packs/day: 0.50     Start date: 2000    Smokeless tobacco: Never Used    Tobacco comment: Will contact PCP when ready to quit. Vaping Use    Vaping Use: Never used   Substance Use Topics    Alcohol use: No    Drug use: No       Review of Systems:     Review of Systems   Constitutional: Positive for fatigue. Negative for appetite change, chills, fever and unexpected weight change. HENT: Negative. Eyes: Negative. Respiratory: Negative for cough, shortness of breath and wheezing. Cardiovascular: Negative for chest pain, palpitations and leg swelling. Gastrointestinal: Negative for abdominal pain, blood in stool, constipation and diarrhea. Endocrine: Negative for cold intolerance, heat intolerance, polydipsia, polyphagia and polyuria. Genitourinary: Negative. Negative for dysuria, frequency, menstrual problem, pelvic pain, urgency, vaginal discharge and vaginal pain. Musculoskeletal: Negative for arthralgias and myalgias. Allergic/Immunologic: Negative for environmental allergies and food allergies. Neurological: Positive for headaches. Negative for dizziness, weakness, light-headedness and numbness. Hematological: Negative for adenopathy. Psychiatric/Behavioral: Positive for sleep disturbance (works 3rd shift). Negative for agitation, dysphoric mood, self-injury and suicidal ideas. The patient is not nervous/anxious. PHQ Scores 3/28/2022 4/21/2021 3/25/2019   PHQ2 Score 0 0 0   PHQ9 Score 0 0 0     Interpretation of Total Score Depression Severity: 1-4 = Minimal depression, 5-9 = Mild depression, 10-14 = Moderate depression, 15-19 = Moderately severe depression, 20-27 = Severe depression     Physical Exam:     Vitals:    04/11/22 1317 04/11/22 1346   BP: (!) 180/100 (!) 150/90   Pulse: 81    SpO2: 98%    Weight: 178 lb 12.8 oz (81.1 kg)       Body mass index is 34.92 kg/m². Physical Exam  Constitutional:       Appearance: Normal appearance. She is well-developed and well-groomed.  She is obese. HENT:      Head: Normocephalic. Nose: Nose normal.      Mouth/Throat:      Mouth: Mucous membranes are moist.   Eyes:      Conjunctiva/sclera: Conjunctivae normal.      Pupils: Pupils are equal, round, and reactive to light. Neck:      Thyroid: No thyromegaly. Cardiovascular:      Rate and Rhythm: Normal rate and regular rhythm. Heart sounds: Normal heart sounds. Pulmonary:      Effort: Pulmonary effort is normal.      Breath sounds: Normal breath sounds. No wheezing. Chest:      Chest wall: No mass. Breasts: Breasts are symmetrical.      Right: Normal. No axillary adenopathy or supraclavicular adenopathy. Left: Normal. No axillary adenopathy or supraclavicular adenopathy. Abdominal:      General: Bowel sounds are normal.      Palpations: Abdomen is soft. Tenderness: There is no abdominal tenderness. Genitourinary:     General: Normal vulva. Labia:         Right: No rash or tenderness. Left: No rash or tenderness. Vagina: Normal.      Cervix: No cervical motion tenderness or discharge. Uterus: Normal.       Adnexa: Right adnexa normal and left adnexa normal.        Right: No mass or tenderness. Left: No mass or tenderness. Musculoskeletal:      Cervical back: Neck supple. Right lower leg: No edema. Left lower leg: No edema. Lymphadenopathy:      Cervical: No cervical adenopathy. Upper Body:      Right upper body: No supraclavicular or axillary adenopathy. Left upper body: No supraclavicular or axillary adenopathy. Lower Body: No right inguinal adenopathy. No left inguinal adenopathy. Skin:     Capillary Refill: Capillary refill takes less than 2 seconds. Neurological:      Mental Status: She is alert and oriented to person, place, and time. Gait: Gait normal.   Psychiatric:         Mood and Affect: Mood normal.         Behavior: Behavior is cooperative. Assessment/Plan:   1.  Visit for gynecologic examination  -     PAP SMEAR; Future  2. Fatigue, unspecified type  -     levothyroxine (SYNTHROID) 50 MCG tablet; Take 1 tablet by mouth daily, Disp-30 tablet, R-5Normal  -     vitamin D (ERGOCALCIFEROL) 1.25 MG (81368 UT) CAPS capsule; Take 1 capsule by mouth once a week, Disp-12 capsule, R-0Normal  3. Hypothyroidism, unspecified type  -     levothyroxine (SYNTHROID) 50 MCG tablet; Take 1 tablet by mouth daily, Disp-30 tablet, R-5Normal  4. Hepatic steatosis  5. Primary hypertension  -     losartan-hydroCHLOROthiazide (HYZAAR) 50-12.5 MG per tablet; Take 1 tablet by mouth daily, Disp-30 tablet, R-5Normal  6. Mixed hyperlipidemia  7. Vitamin D deficiency  -     vitamin D (ERGOCALCIFEROL) 1.25 MG (49656 UT) CAPS capsule; Take 1 capsule by mouth once a week, Disp-12 capsule, R-0Normal     Add HCTZ 12.5 mg for better control of blood pressure. All patient questions answered. Patient voiced understanding. Instructed to continue current medications. Patient agreed with treatment plan. Follow up as directed. Return in about 4 weeks (around 5/9/2022) for HTN. Please note that this chart was generated using voice recognition Dragon dictation software. Although every effort was made to ensure the accuracy of this automated transcription, some errors in transcription may have occurred.     Electronically signed by JOCELINE Landaverde CNP on 4/22/2022

## 2022-04-20 LAB — PAP AGE BASED: NORMAL

## 2022-04-22 PROBLEM — E55.9 VITAMIN D DEFICIENCY: Status: ACTIVE | Noted: 2022-04-22

## 2022-04-22 ASSESSMENT — ENCOUNTER SYMPTOMS
CONSTIPATION: 0
ABDOMINAL PAIN: 0
COUGH: 0
EYES NEGATIVE: 1
SHORTNESS OF BREATH: 0
BLOOD IN STOOL: 0
WHEEZING: 0
DIARRHEA: 0

## 2022-06-13 ENCOUNTER — OFFICE VISIT (OUTPATIENT)
Dept: FAMILY MEDICINE CLINIC | Age: 42
End: 2022-06-13
Payer: COMMERCIAL

## 2022-06-13 VITALS
DIASTOLIC BLOOD PRESSURE: 88 MMHG | SYSTOLIC BLOOD PRESSURE: 120 MMHG | OXYGEN SATURATION: 97 % | BODY MASS INDEX: 34.53 KG/M2 | WEIGHT: 176.8 LBS | HEART RATE: 64 BPM

## 2022-06-13 DIAGNOSIS — K76.0 HEPATIC STEATOSIS: ICD-10-CM

## 2022-06-13 DIAGNOSIS — E03.9 HYPOTHYROIDISM, UNSPECIFIED TYPE: ICD-10-CM

## 2022-06-13 DIAGNOSIS — E55.9 VITAMIN D DEFICIENCY: ICD-10-CM

## 2022-06-13 DIAGNOSIS — I10 PRIMARY HYPERTENSION: Primary | ICD-10-CM

## 2022-06-13 DIAGNOSIS — R53.83 FATIGUE, UNSPECIFIED TYPE: ICD-10-CM

## 2022-06-13 DIAGNOSIS — E78.2 MIXED HYPERLIPIDEMIA: ICD-10-CM

## 2022-06-13 PROCEDURE — 99213 OFFICE O/P EST LOW 20 MIN: CPT | Performed by: NURSE PRACTITIONER

## 2022-06-13 RX ORDER — LEVOTHYROXINE AND LIOTHYRONINE 9.5; 2.25 UG/1; UG/1
15 TABLET ORAL DAILY
Qty: 30 TABLET | Refills: 3 | Status: SHIPPED | OUTPATIENT
Start: 2022-06-13 | End: 2022-09-12 | Stop reason: DRUGHIGH

## 2022-06-13 ASSESSMENT — ENCOUNTER SYMPTOMS
CONSTIPATION: 0
DIARRHEA: 0
ABDOMINAL PAIN: 0
VOMITING: 0
NAUSEA: 1
WHEEZING: 0
SHORTNESS OF BREATH: 0
COUGH: 0

## 2022-06-13 NOTE — PROGRESS NOTES
[address] [address] [address]  Dept: [phone]  Dept Fax: [phone]    History and Physical  Patient:  Cori Meade  YOB: 1980  Date of Service:  2022    Subjective:   Cori Meade (:  1980) is a 38 y.o. female, Established patient, here for evaluation of the following chief complaint(s):    Chief Complaint   Patient presents with    Hyperlipidemia    Hypertension     has been monitoring at home and says has seen an improvement in readings    Hypothyroidism     states medication causes issues with Meniers disease gets sick lightheaded and wondering what other options are available       Blood pressure readings at home have been better. States the synthroid flares the Meniers. She stopped taking the medication a few weeks ago and reports immediate improvement with symptoms. She continues to smoke cigarettes. Hypertension  This is a chronic problem. The current episode started more than 1 month ago. The problem has been waxing and waning since onset. The problem is uncontrolled. Associated symptoms include anxiety, headaches, malaise/fatigue and shortness of breath. Pertinent negatives include no blurred vision, chest pain, orthopnea, palpitations, peripheral edema, PND or sweats. Agents associated with hypertension include thyroid hormones and NSAIDs. Risk factors for coronary artery disease include smoking/tobacco exposure, family history, obesity, sedentary lifestyle and dyslipidemia. Past treatments include nothing. Compliance problems include exercise and diet.   Identifiable causes of hypertension include a hypertension causing med and a thyroid problem.        The 10-year ASCVD risk score (Jaime Fulton et al., 2013) is: 5.1%    Values used to calculate the score:      Age: 38 years      Sex: Female      Is Non- : No      Diabetic: No      Tobacco smoker: Yes      Systolic Blood Pressure: 120 mmHg      Is BP treated: Yes      HDL Cholesterol: 43 mg/dL      Total Cholesterol: 211 mg/dL     BP Readings from Last 3 Encounters:   06/13/22 120/88   04/11/22 (!) 150/90   03/28/22 (!) 142/88      Pulse Readings from Last 3 Encounters:   06/13/22 64   04/11/22 81   03/28/22 86      Wt Readings from Last 3 Encounters:   06/13/22 176 lb 12.8 oz (80.2 kg)   04/11/22 178 lb 12.8 oz (81.1 kg)   03/28/22 175 lb 9.6 oz (79.7 kg)        Allergies   Allergen Reactions    Levothyroxine      Flares sinus and dizziness       Current Outpatient Medications   Medication Sig Dispense Refill    thyroid (ARMOUR THYROID) 15 MG tablet Take 1 tablet by mouth daily 30 tablet 3    vitamin D (ERGOCALCIFEROL) 1.25 MG (45748 UT) CAPS capsule Take 1 capsule by mouth once a week 12 capsule 0    losartan-hydroCHLOROthiazide (HYZAAR) 50-12.5 MG per tablet Take 1 tablet by mouth daily 30 tablet 5    ibuprofen (ADVIL;MOTRIN) 800 MG tablet Take 800 mg by mouth as needed for Pain      Misc Natural Product Nasal (PONARIS) SOLN 1 spray by Nasal route 2 times daily 1 Bottle     loratadine (CLARITIN) 10 MG tablet Take 1 tablet by mouth daily 30 tablet 5     No current facility-administered medications for this visit.         Past Medical History:   Diagnosis Date    Anemia, iron deficiency 2017    Anxiety     Depression     Hypothyroid     Meniere disease        Past Surgical History:   Procedure Laterality Date    BREAST LUMPECTOMY  25 years ago    DILATION AND CURETTAGE OF UTERUS  2018    TUBAL LIGATION       Family History   Problem Relation Age of Onset    Cancer Mother         liver, stomach, colon    High Blood Pressure Mother     Other Father         fatty liver    Diabetes Father     Asthma Father     High Blood Pressure Father     Sleep Apnea Father     High Blood Pressure Half-Sister     Mental Illness Half-Sister     Anemia Half-Sister     Mental Illness Half-Sister         anxiety    Depression Half-Sister         and anxiety    Depression Half-Brother     High Blood Pressure Half-Brother      Social History     Tobacco Use    Smoking status: Current Every Day Smoker     Packs/day: 0.50     Start date: 2000    Smokeless tobacco: Never Used    Tobacco comment: Will contact PCP when ready to quit. Vaping Use    Vaping Use: Never used   Substance Use Topics    Alcohol use: No    Drug use: No       Review of Systems:     Review of Systems   Constitutional: Positive for fatigue (improving). Negative for appetite change, chills and fever. HENT: Negative. Respiratory: Negative for cough, shortness of breath and wheezing. Cardiovascular: Negative for chest pain, palpitations and leg swelling. Gastrointestinal: Positive for nausea. Negative for abdominal pain, constipation, diarrhea and vomiting. Endocrine: Positive for cold intolerance (occasional). Negative for heat intolerance, polydipsia, polyphagia and polyuria. Neurological: Negative for dizziness, light-headedness and headaches. Psychiatric/Behavioral: Positive for dysphoric mood (mild). Negative for agitation, self-injury, sleep disturbance and suicidal ideas. The patient is nervous/anxious (mild). PHQ Scores 3/28/2022 4/21/2021 3/25/2019   PHQ2 Score 0 0 0   PHQ9 Score 0 0 0     Interpretation of Total Score Depression Severity: 1-4 = Minimal depression, 5-9 = Mild depression, 10-14 = Moderate depression, 15-19 = Moderately severe depression, 20-27 = Severe depression     Physical Exam:     Vitals:    06/13/22 1020   BP: 120/88   Site: Left Upper Arm   Position: Sitting   Cuff Size: Medium Adult   Pulse: 64   SpO2: 97%   Weight: 176 lb 12.8 oz (80.2 kg)      Body mass index is 34.53 kg/m². Physical Exam  Constitutional:       Appearance: Normal appearance. She is well-developed and well-groomed. She is obese. HENT:      Head: Normocephalic.    Eyes:      Conjunctiva/sclera: Conjunctivae normal.      Pupils: Pupils are equal, round, and reactive to light. Neck:      Thyroid: No thyromegaly. Vascular: No carotid bruit. Cardiovascular:      Rate and Rhythm: Normal rate and regular rhythm. Heart sounds: Normal heart sounds. Pulmonary:      Effort: Pulmonary effort is normal.      Breath sounds: Normal breath sounds. No wheezing. Abdominal:      General: Bowel sounds are normal.      Palpations: Abdomen is soft. Tenderness: There is no abdominal tenderness. Musculoskeletal:      Cervical back: Neck supple. Right lower leg: No edema. Left lower leg: No edema. Lymphadenopathy:      Cervical: No cervical adenopathy. Skin:     Capillary Refill: Capillary refill takes less than 2 seconds. Neurological:      Mental Status: She is alert and oriented to person, place, and time. Gait: Gait normal.   Psychiatric:         Mood and Affect: Mood normal.         Behavior: Behavior is cooperative. Lab Results   Component Value Date    TSHFT4 9.85 (H) 04/04/2022    TSH 11.80 (H) 07/03/2019       Assessment/Plan:   1. Primary hypertension  Assessment & Plan:   Well-controlled, continue current medications  2. Hypothyroidism, unspecified type  -     thyroid (ARMOUR THYROID) 15 MG tablet; Take 1 tablet by mouth daily, Disp-30 tablet, R-3Normal  -     TSH with Reflex; Future  3. Vitamin D deficiency  4. Mixed hyperlipidemia  5. Fatigue, unspecified type  6. Hepatic steatosis      Start [place] Thyroid in place of levothyroxine to see if it is better tolerated. Will need repeat TSH in 8 weeks. All patient questions answered. Patient voiced understanding. Instructed to continue current medications. Patient agreed with treatment plan. Follow up as directed. Return in about 3 months (around 9/13/2022). Please note that this chart was generated using voice recognition Dragon dictation software.   Although every effort was made to ensure the accuracy of this automated transcription, some errors in transcription may have occurred.     Electronically signed by JOCELINE Apodaca CNP on 6/14/2022  t

## 2022-09-02 LAB
T4 FREE: 0.71 NG/DL (ref 0.78–2.19)
TSH REFLEX FT4: 6.12 MIU/ML (ref 0.49–4.67)

## 2022-09-06 DIAGNOSIS — E03.9 HYPOTHYROIDISM, UNSPECIFIED TYPE: ICD-10-CM

## 2022-09-12 ENCOUNTER — OFFICE VISIT (OUTPATIENT)
Dept: FAMILY MEDICINE CLINIC | Age: 42
End: 2022-09-12
Payer: COMMERCIAL

## 2022-09-12 VITALS
SYSTOLIC BLOOD PRESSURE: 126 MMHG | DIASTOLIC BLOOD PRESSURE: 88 MMHG | WEIGHT: 181.8 LBS | BODY MASS INDEX: 35.51 KG/M2 | HEART RATE: 64 BPM | OXYGEN SATURATION: 97 %

## 2022-09-12 DIAGNOSIS — K76.0 HEPATIC STEATOSIS: ICD-10-CM

## 2022-09-12 DIAGNOSIS — R53.83 FATIGUE, UNSPECIFIED TYPE: ICD-10-CM

## 2022-09-12 DIAGNOSIS — R20.2 NUMBNESS AND TINGLING IN BOTH HANDS: ICD-10-CM

## 2022-09-12 DIAGNOSIS — E78.2 MIXED HYPERLIPIDEMIA: ICD-10-CM

## 2022-09-12 DIAGNOSIS — E03.9 HYPOTHYROIDISM, UNSPECIFIED TYPE: ICD-10-CM

## 2022-09-12 DIAGNOSIS — R20.0 NUMBNESS AND TINGLING IN BOTH HANDS: ICD-10-CM

## 2022-09-12 DIAGNOSIS — M67.431 GANGLION CYST OF VOLAR ASPECT OF RIGHT WRIST: ICD-10-CM

## 2022-09-12 DIAGNOSIS — I10 PRIMARY HYPERTENSION: Primary | ICD-10-CM

## 2022-09-12 DIAGNOSIS — E55.9 VITAMIN D DEFICIENCY: ICD-10-CM

## 2022-09-12 PROCEDURE — 99214 OFFICE O/P EST MOD 30 MIN: CPT | Performed by: NURSE PRACTITIONER

## 2022-09-12 RX ORDER — ERGOCALCIFEROL 1.25 MG/1
50000 CAPSULE ORAL WEEKLY
Qty: 12 CAPSULE | Refills: 0 | Status: SHIPPED | OUTPATIENT
Start: 2022-09-12

## 2022-09-12 SDOH — ECONOMIC STABILITY: FOOD INSECURITY: WITHIN THE PAST 12 MONTHS, THE FOOD YOU BOUGHT JUST DIDN'T LAST AND YOU DIDN'T HAVE MONEY TO GET MORE.: NEVER TRUE

## 2022-09-12 SDOH — ECONOMIC STABILITY: FOOD INSECURITY: WITHIN THE PAST 12 MONTHS, YOU WORRIED THAT YOUR FOOD WOULD RUN OUT BEFORE YOU GOT MONEY TO BUY MORE.: NEVER TRUE

## 2022-09-12 ASSESSMENT — SOCIAL DETERMINANTS OF HEALTH (SDOH): HOW HARD IS IT FOR YOU TO PAY FOR THE VERY BASICS LIKE FOOD, HOUSING, MEDICAL CARE, AND HEATING?: NOT HARD AT ALL

## 2022-09-12 ASSESSMENT — ENCOUNTER SYMPTOMS
COUGH: 0
VISUAL CHANGE: 0
HAIR LOSS: 0
WHEEZING: 0
BLURRED VISION: 0
SHORTNESS OF BREATH: 0
GASTROINTESTINAL NEGATIVE: 1

## 2022-09-18 ASSESSMENT — ENCOUNTER SYMPTOMS: ORTHOPNEA: 0

## 2022-11-14 DIAGNOSIS — E03.9 HYPOTHYROIDISM, UNSPECIFIED TYPE: ICD-10-CM

## 2022-11-15 NOTE — TELEPHONE ENCOUNTER
Gaby Chiu is calling to request a refill on the following medication(s):  Requested Prescriptions     Pending Prescriptions Disp Refills    NP THYROID 15 MG tablet [Pharmacy Med Name: NP Thyroid 15 MG Oral Tablet] 30 tablet 0     Sig: Take 1 tablet by mouth once daily       Last Visit Date (If Applicable):  2/29/1572    Next Visit Date:    12/19/2022

## 2022-11-16 RX ORDER — LEVOTHYROXINE, LIOTHYRONINE 9.5; 2.25 UG/1; UG/1
TABLET ORAL
Qty: 30 TABLET | Refills: 0 | Status: SHIPPED | OUTPATIENT
Start: 2022-11-16

## 2022-12-12 LAB
T4 FREE: 0.77 NG/DL (ref 0.78–2.19)
TSH REFLEX FT4: 6.99 MIU/ML (ref 0.49–4.67)
VITAMIN B-12: 327 PG/ML (ref 239–931)
VITAMIN D 25-HYDROXY: 42.1 NG/ML (ref 30–100)

## 2022-12-14 DIAGNOSIS — R53.83 FATIGUE, UNSPECIFIED TYPE: ICD-10-CM

## 2022-12-14 DIAGNOSIS — R20.2 NUMBNESS AND TINGLING IN BOTH HANDS: ICD-10-CM

## 2022-12-14 DIAGNOSIS — E55.9 VITAMIN D DEFICIENCY: ICD-10-CM

## 2022-12-14 DIAGNOSIS — E03.9 HYPOTHYROIDISM, UNSPECIFIED TYPE: ICD-10-CM

## 2022-12-14 DIAGNOSIS — R20.0 NUMBNESS AND TINGLING IN BOTH HANDS: ICD-10-CM

## 2022-12-19 ENCOUNTER — OFFICE VISIT (OUTPATIENT)
Dept: FAMILY MEDICINE CLINIC | Age: 42
End: 2022-12-19
Payer: COMMERCIAL

## 2022-12-19 VITALS
DIASTOLIC BLOOD PRESSURE: 86 MMHG | BODY MASS INDEX: 36.91 KG/M2 | WEIGHT: 188 LBS | OXYGEN SATURATION: 98 % | HEART RATE: 74 BPM | SYSTOLIC BLOOD PRESSURE: 124 MMHG | HEIGHT: 60 IN

## 2022-12-19 DIAGNOSIS — E03.9 HYPOTHYROIDISM, UNSPECIFIED TYPE: ICD-10-CM

## 2022-12-19 DIAGNOSIS — E55.9 VITAMIN D DEFICIENCY: ICD-10-CM

## 2022-12-19 DIAGNOSIS — K76.0 HEPATIC STEATOSIS: ICD-10-CM

## 2022-12-19 DIAGNOSIS — E78.2 MIXED HYPERLIPIDEMIA: ICD-10-CM

## 2022-12-19 DIAGNOSIS — R20.0 NUMBNESS AND TINGLING IN BOTH HANDS: ICD-10-CM

## 2022-12-19 DIAGNOSIS — R53.82 CHRONIC FATIGUE: ICD-10-CM

## 2022-12-19 DIAGNOSIS — I10 PRIMARY HYPERTENSION: Primary | ICD-10-CM

## 2022-12-19 DIAGNOSIS — R20.2 NUMBNESS AND TINGLING IN BOTH HANDS: ICD-10-CM

## 2022-12-19 PROCEDURE — 3074F SYST BP LT 130 MM HG: CPT | Performed by: NURSE PRACTITIONER

## 2022-12-19 PROCEDURE — G8484 FLU IMMUNIZE NO ADMIN: HCPCS | Performed by: NURSE PRACTITIONER

## 2022-12-19 PROCEDURE — 4004F PT TOBACCO SCREEN RCVD TLK: CPT | Performed by: NURSE PRACTITIONER

## 2022-12-19 PROCEDURE — G8427 DOCREV CUR MEDS BY ELIG CLIN: HCPCS | Performed by: NURSE PRACTITIONER

## 2022-12-19 PROCEDURE — G8417 CALC BMI ABV UP PARAM F/U: HCPCS | Performed by: NURSE PRACTITIONER

## 2022-12-19 PROCEDURE — 99213 OFFICE O/P EST LOW 20 MIN: CPT | Performed by: NURSE PRACTITIONER

## 2022-12-19 PROCEDURE — 3078F DIAST BP <80 MM HG: CPT | Performed by: NURSE PRACTITIONER

## 2022-12-19 RX ORDER — LEVOTHYROXINE AND LIOTHYRONINE 9.5; 2.25 UG/1; UG/1
TABLET ORAL
Qty: 30 TABLET | Refills: 0 | Status: CANCELLED | OUTPATIENT
Start: 2022-12-19

## 2022-12-19 NOTE — PROGRESS NOTES
[address] [address] [address]  Dept: [phone]  Dept Fax: [phone]    History and Physical  Patient:  Vin Luz  YOB: 1980  Date of Service:  2022    Assessment/Plan:   1. Primary hypertension  Assessment & Plan:   Well-controlled, continue current medications  2. Mixed hyperlipidemia  3. Hypothyroidism, unspecified type  Assessment & Plan:  Lab Results   Component Value Date    TSHFT4 6.99 (H) 2022    TSH 11.80 (H) 2019     Orders:  -     TSH with Reflex; Future  4. Vitamin D deficiency  Assessment & Plan:  Lab Results   Component Value Date/Time    VITD25 42.1 2022 11:20 AM      5. Numbness and tingling in both hands  Assessment & Plan:  Lab Results   Component Value Date    AMCVVJGC28 327 2022     B12 level is low-normal. Recommend she start B12 100 mcg daily supplement. Orders:  -     cyanocobalamin 100 MCG tablet; Take 1 tablet by mouth daily, Disp-30 tablet, R-3Normal  -     Vitamin B12; Future  6. Hepatic steatosis  7. Chronic fatigue     All patient questions answered. Patient voiced understanding. Instructed to continue current medications. Patient agreed with treatment plan. Follow up as directed. Return in about 6 months (around 2023) for HTN. Subjective:   Vin Luz (:  1980) is a 38 y.o. female, Established patient, here for evaluation of the following chief complaint(s):    Chief Complaint   Patient presents with    Hypertension    3 Month Follow-Up    Hypothyroidism     Pt thought thyroid med was being increased from 13 to the 16 so not sure which she is to be taking      HPI  Hypertension  This is a chronic problem. The current episode started more than 1 year ago. The problem is controlled. Associated symptoms include malaise/fatigue.  Pertinent negatives include no anxiety, blurred vision, chest pain, headaches, orthopnea, palpitations, peripheral edema, PND or shortness of breath. Agents associated with hypertension include thyroid hormones. Risk factors for coronary artery disease include obesity, sedentary lifestyle and dyslipidemia. Past treatments include angiotensin blockers and diuretics. The current treatment provides moderate improvement. Compliance problems include exercise and diet. Identifiable causes of hypertension include a thyroid problem. The 10-year ASCVD risk score (Andreas GONSALES, et al., 2019) is: 5.4%    Values used to calculate the score:      Age: 38 years      Sex: Female      Is Non- : No      Diabetic: No      Tobacco smoker: Yes      Systolic Blood Pressure: 449 mmHg      Is BP treated: Yes      HDL Cholesterol: 43 mg/dL      Total Cholesterol: 211 mg/dL     BP Readings from Last 3 Encounters:   12/19/22 124/86   09/12/22 126/88   06/13/22 120/88      Pulse Readings from Last 3 Encounters:   12/19/22 74   09/12/22 64   06/13/22 64      Wt Readings from Last 3 Encounters:   12/19/22 188 lb (85.3 kg)   09/12/22 181 lb 12.8 oz (82.5 kg)   06/13/22 176 lb 12.8 oz (80.2 kg)        Allergies   Allergen Reactions    Levothyroxine      Flares sinus and dizziness       Current Outpatient Medications   Medication Sig Dispense Refill    cyanocobalamin 100 MCG tablet Take 1 tablet by mouth daily 30 tablet 3    vitamin D (ERGOCALCIFEROL) 1.25 MG (53147 UT) CAPS capsule Take 1 capsule by mouth once a week 12 capsule 0    losartan-hydroCHLOROthiazide (HYZAAR) 50-12.5 MG per tablet Take 1 tablet by mouth daily 30 tablet 5    ibuprofen (ADVIL;MOTRIN) 800 MG tablet Take 800 mg by mouth as needed for Pain      Misc Natural Product Nasal (PONARIS) SOLN 1 spray by Nasal route 2 times daily 1 Bottle     loratadine (CLARITIN) 10 MG tablet Take 1 tablet by mouth daily 30 tablet 5    thyroid (ARMOUR) 30 MG tablet Take 1 tablet by mouth daily 30 tablet 3     No current facility-administered medications for this visit.         Past Medical History:   Diagnosis Date    Anemia, iron deficiency 2017    Anxiety     Depression     Hypothyroid     Meniere disease        Past Surgical History:   Procedure Laterality Date    BREAST LUMPECTOMY  25 years ago    DILATION AND CURETTAGE OF UTERUS  2018    TUBAL LIGATION       Family History   Problem Relation Age of Onset    Cancer Mother         liver, stomach, colon    High Blood Pressure Mother     Other Father         fatty liver    Diabetes Father     Asthma Father     High Blood Pressure Father     Sleep Apnea Father     High Blood Pressure Half-Sister     Mental Illness Half-Sister     Anemia Half-Sister     Mental Illness Half-Sister         anxiety    Depression Half-Sister         and anxiety    Depression Half-Brother     High Blood Pressure Half-Brother      Social History     Tobacco Use    Smoking status: Every Day     Packs/day: 0.50     Types: Cigarettes     Start date: 2000    Smokeless tobacco: Never    Tobacco comments: Will contact PCP when ready to quit. Vaping Use    Vaping Use: Never used   Substance Use Topics    Alcohol use: No    Drug use: No       Review of Systems:     Review of Systems   Constitutional:  Positive for fatigue. Negative for appetite change, chills and fever. HENT: Negative. Eyes: Negative. Respiratory:  Negative for cough, shortness of breath and wheezing. Cardiovascular:  Negative for chest pain, palpitations and leg swelling. Gastrointestinal:  Negative for abdominal pain, constipation and diarrhea. Endocrine: Negative for cold intolerance, heat intolerance, polydipsia, polyphagia and polyuria. Genitourinary: Negative. Musculoskeletal:  Negative for arthralgias and myalgias. Skin: Negative. Allergic/Immunologic: Positive for environmental allergies. Neurological:  Positive for numbness (tingling bilateral hand). Negative for dizziness, weakness, light-headedness and headaches.    Psychiatric/Behavioral:  Negative for agitation, dysphoric mood, self-injury, sleep disturbance and suicidal ideas. The patient is not nervous/anxious. PHQ Scores 3/28/2022 4/21/2021 3/25/2019   PHQ2 Score 0 0 0   PHQ9 Score 0 0 0     Interpretation of Total Score Depression Severity: 1-4 = Minimal depression, 5-9 = Mild depression, 10-14 = Moderate depression, 15-19 = Moderately severe depression, 20-27 = Severe depression     Physical Exam:     Vitals:    12/19/22 0957   BP: 124/86   Site: Right Upper Arm   Position: Sitting   Cuff Size: Small Adult   Pulse: 74   SpO2: 98%   Weight: 188 lb (85.3 kg)   Height: 5' (1.524 m)      Body mass index is 36.72 kg/m². Physical Exam  Constitutional:       Appearance: Normal appearance. She is well-developed and well-groomed. She is obese. HENT:      Head: Normocephalic. Eyes:      Conjunctiva/sclera: Conjunctivae normal.   Neck:      Thyroid: No thyromegaly. Vascular: No carotid bruit. Cardiovascular:      Rate and Rhythm: Normal rate and regular rhythm. Heart sounds: Normal heart sounds. Pulmonary:      Effort: Pulmonary effort is normal.      Breath sounds: Normal breath sounds. No wheezing. Abdominal:      General: Bowel sounds are normal.      Palpations: Abdomen is soft. Tenderness: There is no abdominal tenderness. Musculoskeletal:      Cervical back: Neck supple. Right lower leg: No edema. Left lower leg: No edema. Lymphadenopathy:      Cervical: No cervical adenopathy. Skin:     Capillary Refill: Capillary refill takes less than 2 seconds. Neurological:      Mental Status: She is alert and oriented to person, place, and time. Gait: Gait normal.   Psychiatric:         Mood and Affect: Mood normal.         Behavior: Behavior is cooperative. Please note that this chart was generated using voice recognition Dragon dictation software.   Although every effort was made to ensure the accuracy of this automated transcription, some errors in transcription may have occurred.     Electronically signed by JOCELINE Baltazar CNP on 12/29/2022

## 2022-12-21 DIAGNOSIS — E03.9 HYPOTHYROIDISM, UNSPECIFIED TYPE: ICD-10-CM

## 2022-12-21 RX ORDER — LEVOTHYROXINE AND LIOTHYRONINE 19; 4.5 UG/1; UG/1
30 TABLET ORAL DAILY
Qty: 30 TABLET | Refills: 3 | Status: SHIPPED | OUTPATIENT
Start: 2022-12-21

## 2022-12-29 PROBLEM — R53.82 CHRONIC FATIGUE: Status: ACTIVE | Noted: 2022-04-01

## 2022-12-29 PROBLEM — R53.83 OTHER FATIGUE: Status: RESOLVED | Noted: 2022-04-01 | Resolved: 2022-12-29

## 2022-12-29 ASSESSMENT — ENCOUNTER SYMPTOMS
CONSTIPATION: 0
DIARRHEA: 0
SHORTNESS OF BREATH: 0
ABDOMINAL PAIN: 0
COUGH: 0
EYES NEGATIVE: 1
WHEEZING: 0

## 2022-12-29 NOTE — ASSESSMENT & PLAN NOTE
Lab Results   Component Value Date    [id number] 327 12/12/2022     B12 level is low-normal. Recommend she start B12 100 mcg daily supplement. Rhomboid Transposition Flap Text: The defect edges were debeveled with a #15 scalpel blade.  Given the location of the defect and the proximity to free margins a rhomboid transposition flap was deemed most appropriate.  Using a sterile surgical marker, an appropriate rhomboid flap was drawn incorporating the defect.    The area thus outlined was incised deep to adipose tissue with a #15 scalpel blade.  The skin margins were undermined to an appropriate distance in all directions utilizing iris scissors.

## 2023-01-27 NOTE — PROGRESS NOTES
History and Physical    Patient's Name/Date of Birth: Mary Jo Servin / 1980, 46 y.o. yo    Date: September 4, 2021     PCP: JOCELINE Rosales CNP     History of Present Illness: This 37-year-old female is now seen 5 weeks status post repair of flexor tendon injuries to her left index long ring and small fingers, date of surgery 7/26/2021. Patient is doing fairly well having been protected with an extension block splint and undergoing passive range of motion. She is being followed by occupational therapy as well. Family history negative    Past Medical History:   Diagnosis Date    Anemia, iron deficiency 2017    Anxiety     Depression     Hypothyroid     Meniere disease       Past Surgical History:   Procedure Laterality Date    BREAST LUMPECTOMY  25 years ago    DILATION AND CURETTAGE OF UTERUS  2018    TUBAL LIGATION        Allergies: Patient has no known allergies. Current Outpatient Medications   Medication Sig Dispense Refill    ibuprofen (ADVIL;MOTRIN) 800 MG tablet Take 800 mg by mouth as needed for Pain      Misc Natural Product Nasal (PONARIS) SOLN 1 spray by Nasal route 2 times daily 1 Bottle     loratadine (CLARITIN) 10 MG tablet Take 1 tablet by mouth daily 30 tablet 5     No current facility-administered medications for this visit. Social History     Tobacco Use    Smoking status: Current Every Day Smoker     Packs/day: 0.50     Start date: 2000    Smokeless tobacco: Never Used    Tobacco comment: Will contact PCP when ready to quit.     Substance Use Topics    Alcohol use: No        Review of Systems:  Negative  Other than stated above in the HPI is negative      Physical exam:     General appearance: no acute distress  Head: NAD  Neck: supple  Lungs: No audible wheezing, respiratory rate normal  Heart: Perfusion to all extremeties  Extremities: Examination of her left hand revealed that the resting position of the digits was normal.  There appeared to be some active flexion at the DIP joints and with active flexion to able to come within 3 to 4 cm of the distal palmar flexion crease and passively able to come within 2 cm. She was assessed to be neurovascular intact. Assessment:  Status post repair flexor tendons left index long ring and small fingers doing reasonably well. Plan:  Recommendation was to continue to protect the hand with extension splint for an additional week and then graduate from it continuing therapy 3 times per week. Patient will be reassessed again in 2 weeks to review her functional status. No orders of the defined types were placed in this encounter.               Fortunato Frias MD,MD 9/4/2021 at 3:17 PM Yes

## 2023-02-06 DIAGNOSIS — I10 PRIMARY HYPERTENSION: ICD-10-CM

## 2023-02-06 RX ORDER — LOSARTAN POTASSIUM AND HYDROCHLOROTHIAZIDE 12.5; 5 MG/1; MG/1
TABLET ORAL
Qty: 30 TABLET | Refills: 5 | Status: SHIPPED | OUTPATIENT
Start: 2023-02-06 | End: 2023-03-28 | Stop reason: SDUPTHER

## 2023-02-06 NOTE — TELEPHONE ENCOUNTER
Eden Landau is calling to request a refill on the following medication(s):  Requested Prescriptions     Pending Prescriptions Disp Refills    losartan-hydroCHLOROthiazide (HYZAAR) 50-12.5 MG per tablet [Pharmacy Med Name: Losartan Potassium-HCTZ 50-12.5 MG Oral Tablet] 30 tablet 0     Sig: Take 1 tablet by mouth once daily       Last Visit Date (If Applicable):  95/10/1892    Next Visit Date:    6/19/2023

## 2023-02-20 LAB
T4 FREE: 0.75 NG/DL (ref 0.78–2.19)
TSH REFLEX FT4: 6.53 MIU/ML (ref 0.49–4.67)
VITAMIN B-12: 486 PG/ML (ref 239–931)

## 2023-02-21 DIAGNOSIS — R20.2 NUMBNESS AND TINGLING IN BOTH HANDS: ICD-10-CM

## 2023-02-21 DIAGNOSIS — R20.0 NUMBNESS AND TINGLING IN BOTH HANDS: ICD-10-CM

## 2023-02-21 DIAGNOSIS — E03.9 HYPOTHYROIDISM, UNSPECIFIED TYPE: ICD-10-CM

## 2023-03-16 ENCOUNTER — TELEPHONE (OUTPATIENT)
Dept: FAMILY MEDICINE CLINIC | Age: 43
End: 2023-03-16

## 2023-03-16 DIAGNOSIS — E03.9 HYPOTHYROIDISM, UNSPECIFIED TYPE: Primary | ICD-10-CM

## 2023-03-16 NOTE — TELEPHONE ENCOUNTER
Please notify patient to continue the same dose of levothyroxine daily, 30 minutes prior to breakfast without missing doses. We are unable to properly manage the thyroid unless medication is taken as prescribed. Repeat TSH in 4 weeks.

## 2023-03-27 DIAGNOSIS — E03.9 HYPOTHYROIDISM, UNSPECIFIED TYPE: ICD-10-CM

## 2023-03-27 DIAGNOSIS — I10 PRIMARY HYPERTENSION: ICD-10-CM

## 2023-03-27 NOTE — TELEPHONE ENCOUNTER
Wendie Thomas is calling to request a refill on the following medication(s):  Requested Prescriptions     Pending Prescriptions Disp Refills    losartan-hydroCHLOROthiazide (HYZAAR) 50-12.5 MG per tablet 90 tablet 1     Sig: Take 1 tablet by mouth daily    thyroid (ARMOUR) 30 MG tablet 90 tablet 0     Sig: Take 1 tablet by mouth daily       Last Visit Date (If Applicable):  76/14/7169    Next Visit Date:    6/19/2023

## 2023-03-28 RX ORDER — LOSARTAN POTASSIUM AND HYDROCHLOROTHIAZIDE 12.5; 5 MG/1; MG/1
1 TABLET ORAL DAILY
Qty: 90 TABLET | Refills: 1 | Status: SHIPPED | OUTPATIENT
Start: 2023-03-28

## 2023-03-28 RX ORDER — LEVOTHYROXINE AND LIOTHYRONINE 19; 4.5 UG/1; UG/1
30 TABLET ORAL DAILY
Qty: 90 TABLET | Refills: 0 | Status: SHIPPED | OUTPATIENT
Start: 2023-03-28

## 2023-06-18 SDOH — ECONOMIC STABILITY: TRANSPORTATION INSECURITY
IN THE PAST 12 MONTHS, HAS LACK OF TRANSPORTATION KEPT YOU FROM MEETINGS, WORK, OR FROM GETTING THINGS NEEDED FOR DAILY LIVING?: PATIENT DECLINED

## 2023-06-18 SDOH — ECONOMIC STABILITY: FOOD INSECURITY: WITHIN THE PAST 12 MONTHS, THE FOOD YOU BOUGHT JUST DIDN'T LAST AND YOU DIDN'T HAVE MONEY TO GET MORE.: PATIENT DECLINED

## 2023-06-18 SDOH — ECONOMIC STABILITY: FOOD INSECURITY: WITHIN THE PAST 12 MONTHS, YOU WORRIED THAT YOUR FOOD WOULD RUN OUT BEFORE YOU GOT MONEY TO BUY MORE.: PATIENT DECLINED

## 2023-06-18 SDOH — ECONOMIC STABILITY: INCOME INSECURITY: HOW HARD IS IT FOR YOU TO PAY FOR THE VERY BASICS LIKE FOOD, HOUSING, MEDICAL CARE, AND HEATING?: PATIENT DECLINED

## 2023-06-18 SDOH — ECONOMIC STABILITY: HOUSING INSECURITY
IN THE LAST 12 MONTHS, WAS THERE A TIME WHEN YOU DID NOT HAVE A STEADY PLACE TO SLEEP OR SLEPT IN A SHELTER (INCLUDING NOW)?: NO

## 2023-06-18 ASSESSMENT — PATIENT HEALTH QUESTIONNAIRE - PHQ9
1. LITTLE INTEREST OR PLEASURE IN DOING THINGS: NEARLY EVERY DAY
8. MOVING OR SPEAKING SO SLOWLY THAT OTHER PEOPLE COULD HAVE NOTICED. OR THE OPPOSITE, BEING SO FIGETY OR RESTLESS THAT YOU HAVE BEEN MOVING AROUND A LOT MORE THAN USUAL: 2
7. TROUBLE CONCENTRATING ON THINGS, SUCH AS READING THE NEWSPAPER OR WATCHING TELEVISION: 1
3. TROUBLE FALLING OR STAYING ASLEEP: SEVERAL DAYS
10. IF YOU CHECKED OFF ANY PROBLEMS, HOW DIFFICULT HAVE THESE PROBLEMS MADE IT FOR YOU TO DO YOUR WORK, TAKE CARE OF THINGS AT HOME, OR GET ALONG WITH OTHER PEOPLE: SOMEWHAT DIFFICULT
1. LITTLE INTEREST OR PLEASURE IN DOING THINGS: 3
SUM OF ALL RESPONSES TO PHQ QUESTIONS 1-9: 15
SUM OF ALL RESPONSES TO PHQ QUESTIONS 1-9: 15
7. TROUBLE CONCENTRATING ON THINGS, SUCH AS READING THE NEWSPAPER OR WATCHING TELEVISION: SEVERAL DAYS
9. THOUGHTS THAT YOU WOULD BE BETTER OFF DEAD, OR OF HURTING YOURSELF: NOT AT ALL
SUM OF ALL RESPONSES TO PHQ9 QUESTIONS 1 & 2: 5
4. FEELING TIRED OR HAVING LITTLE ENERGY: 2
6. FEELING BAD ABOUT YOURSELF - OR THAT YOU ARE A FAILURE OR HAVE LET YOURSELF OR YOUR FAMILY DOWN: 2
3. TROUBLE FALLING OR STAYING ASLEEP: 1
8. MOVING OR SPEAKING SO SLOWLY THAT OTHER PEOPLE COULD HAVE NOTICED. OR THE OPPOSITE - BEING SO FIDGETY OR RESTLESS THAT YOU HAVE BEEN MOVING AROUND A LOT MORE THAN USUAL: MORE THAN HALF THE DAYS
SUM OF ALL RESPONSES TO PHQ9 QUESTIONS 1 & 2: 5
SUM OF ALL RESPONSES TO PHQ QUESTIONS 1-9: 15
SUM OF ALL RESPONSES TO PHQ QUESTIONS 1-9: 15
5. POOR APPETITE OR OVEREATING: 2
10. IF YOU CHECKED OFF ANY PROBLEMS, HOW DIFFICULT HAVE THESE PROBLEMS MADE IT FOR YOU TO DO YOUR WORK, TAKE CARE OF THINGS AT HOME, OR GET ALONG WITH OTHER PEOPLE: 1
5. POOR APPETITE OR OVEREATING: MORE THAN HALF THE DAYS
2. FEELING DOWN, DEPRESSED OR HOPELESS: 2
4. FEELING TIRED OR HAVING LITTLE ENERGY: MORE THAN HALF THE DAYS
SUM OF ALL RESPONSES TO PHQ QUESTIONS 1-9: 15
9. THOUGHTS THAT YOU WOULD BE BETTER OFF DEAD, OR OF HURTING YOURSELF: 0
6. FEELING BAD ABOUT YOURSELF - OR THAT YOU ARE A FAILURE OR HAVE LET YOURSELF OR YOUR FAMILY DOWN: MORE THAN HALF THE DAYS
2. FEELING DOWN, DEPRESSED OR HOPELESS: MORE THAN HALF THE DAYS

## 2023-06-19 ENCOUNTER — OFFICE VISIT (OUTPATIENT)
Dept: FAMILY MEDICINE CLINIC | Age: 43
End: 2023-06-19
Payer: COMMERCIAL

## 2023-06-19 VITALS
OXYGEN SATURATION: 94 % | DIASTOLIC BLOOD PRESSURE: 86 MMHG | WEIGHT: 184.6 LBS | SYSTOLIC BLOOD PRESSURE: 124 MMHG | BODY MASS INDEX: 36.05 KG/M2 | HEART RATE: 68 BPM

## 2023-06-19 DIAGNOSIS — E04.1 THYROID NODULE: ICD-10-CM

## 2023-06-19 DIAGNOSIS — R53.82 CHRONIC FATIGUE: ICD-10-CM

## 2023-06-19 DIAGNOSIS — K76.0 HEPATIC STEATOSIS: ICD-10-CM

## 2023-06-19 DIAGNOSIS — E03.9 HYPOTHYROIDISM, UNSPECIFIED TYPE: ICD-10-CM

## 2023-06-19 DIAGNOSIS — E78.2 MIXED HYPERLIPIDEMIA: ICD-10-CM

## 2023-06-19 DIAGNOSIS — E55.9 VITAMIN D DEFICIENCY: ICD-10-CM

## 2023-06-19 DIAGNOSIS — I10 PRIMARY HYPERTENSION: Primary | ICD-10-CM

## 2023-06-19 PROCEDURE — G8427 DOCREV CUR MEDS BY ELIG CLIN: HCPCS | Performed by: NURSE PRACTITIONER

## 2023-06-19 PROCEDURE — G8417 CALC BMI ABV UP PARAM F/U: HCPCS | Performed by: NURSE PRACTITIONER

## 2023-06-19 PROCEDURE — 4004F PT TOBACCO SCREEN RCVD TLK: CPT | Performed by: NURSE PRACTITIONER

## 2023-06-19 PROCEDURE — 3074F SYST BP LT 130 MM HG: CPT | Performed by: NURSE PRACTITIONER

## 2023-06-19 PROCEDURE — 3078F DIAST BP <80 MM HG: CPT | Performed by: NURSE PRACTITIONER

## 2023-06-19 PROCEDURE — 99214 OFFICE O/P EST MOD 30 MIN: CPT | Performed by: NURSE PRACTITIONER

## 2023-06-19 RX ORDER — LEVOTHYROXINE AND LIOTHYRONINE 19; 4.5 UG/1; UG/1
30 TABLET ORAL DAILY
Qty: 90 TABLET | Refills: 1 | Status: SHIPPED | OUTPATIENT
Start: 2023-06-19

## 2023-06-19 ASSESSMENT — ENCOUNTER SYMPTOMS
COUGH: 0
TROUBLE SWALLOWING: 0
NAUSEA: 0
EYE DISCHARGE: 1
ABDOMINAL PAIN: 0
DIARRHEA: 0
SHORTNESS OF BREATH: 0
WHEEZING: 0
VOMITING: 0
CONSTIPATION: 0

## 2023-06-19 NOTE — PROGRESS NOTES
[address] [address] [address]  Dept: [phone]  Dept Fax: [phone]    History and Physical  Patient:  Rosetta Habermann  YOB: 1980  Date of Service:  2023    Assessment/Plan:   1. Primary hypertension  Assessment & Plan:   Well-controlled, continue current medications. Orders:  -     Comprehensive Metabolic Panel; Future  -     Lipid, Fasting; Future  -     Microalbumin, Ur; Future  2. Hypothyroidism, unspecified type  Assessment & Plan:   Uncontrolled, medication adherence emphasized. Medication changed to NP thyroid. Instructed to recheck TSH in 3 months. Orders:  -     thyroid (NP THYROID) 30 MG tablet; Take 1 tablet by mouth daily, Disp-90 tablet, R-1Normal  -     TSH with Reflex; Future  -     US THYROID; Future  3. Mixed hyperlipidemia  -     Comprehensive Metabolic Panel; Future  -     Lipid, Fasting; Future  4. Vitamin D deficiency  5. Hepatic steatosis  -     Comprehensive Metabolic Panel; Future  6. Thyroid nodule  Assessment & Plan:   Unclear control, continue current medications and US ordered for further evaluation. Orders:  -     US THYROID; Future  7. Chronic fatigue     Has not been taking the [place] thyroid. States she would like to restart the NP thyroid medication. All patient questions answered. Patient voiced understanding. Instructed to continue current medications. Patient agreed with treatment plan. Follow up as directed. Return in about 3 months (around 2023).     Subjective:   Rosetta Habermann (:  1980) is a 41 y.o. female, Established patient, here for evaluation of the following chief complaint(s):    Chief Complaint   Patient presents with    Hypertension    Hypothyroidism     States insurance had changed and when got refills her medications for thyroid were not the same and needs NP thyroid or effects Greta disease     Hyperlipidemia        Hypertension  This is a chronic

## 2023-06-20 PROBLEM — E04.1 THYROID NODULE: Status: ACTIVE | Noted: 2023-06-20

## 2023-06-20 NOTE — ASSESSMENT & PLAN NOTE
Uncontrolled, medication adherence emphasized. Medication changed to NP thyroid. Instructed to recheck TSH in 3 months.

## 2023-09-15 LAB — TSH REFLEX FT4: 4.63 MIU/ML (ref 0.49–4.67)

## 2023-09-18 ENCOUNTER — OFFICE VISIT (OUTPATIENT)
Dept: FAMILY MEDICINE CLINIC | Age: 43
End: 2023-09-18
Payer: COMMERCIAL

## 2023-09-18 VITALS
DIASTOLIC BLOOD PRESSURE: 84 MMHG | WEIGHT: 182.4 LBS | HEART RATE: 61 BPM | OXYGEN SATURATION: 97 % | BODY MASS INDEX: 35.62 KG/M2 | SYSTOLIC BLOOD PRESSURE: 124 MMHG

## 2023-09-18 DIAGNOSIS — R20.0 NUMBNESS AND TINGLING IN BOTH HANDS: ICD-10-CM

## 2023-09-18 DIAGNOSIS — E78.2 MIXED HYPERLIPIDEMIA: ICD-10-CM

## 2023-09-18 DIAGNOSIS — I10 PRIMARY HYPERTENSION: Primary | ICD-10-CM

## 2023-09-18 DIAGNOSIS — E03.9 HYPOTHYROIDISM, UNSPECIFIED TYPE: ICD-10-CM

## 2023-09-18 DIAGNOSIS — E55.9 VITAMIN D DEFICIENCY: ICD-10-CM

## 2023-09-18 DIAGNOSIS — R53.82 CHRONIC FATIGUE: ICD-10-CM

## 2023-09-18 DIAGNOSIS — R20.2 NUMBNESS AND TINGLING IN BOTH HANDS: ICD-10-CM

## 2023-09-18 DIAGNOSIS — R53.83 FATIGUE, UNSPECIFIED TYPE: ICD-10-CM

## 2023-09-18 PROCEDURE — 4004F PT TOBACCO SCREEN RCVD TLK: CPT | Performed by: NURSE PRACTITIONER

## 2023-09-18 PROCEDURE — 99214 OFFICE O/P EST MOD 30 MIN: CPT | Performed by: NURSE PRACTITIONER

## 2023-09-18 PROCEDURE — 3074F SYST BP LT 130 MM HG: CPT | Performed by: NURSE PRACTITIONER

## 2023-09-18 PROCEDURE — G8417 CALC BMI ABV UP PARAM F/U: HCPCS | Performed by: NURSE PRACTITIONER

## 2023-09-18 PROCEDURE — 3078F DIAST BP <80 MM HG: CPT | Performed by: NURSE PRACTITIONER

## 2023-09-18 PROCEDURE — G8427 DOCREV CUR MEDS BY ELIG CLIN: HCPCS | Performed by: NURSE PRACTITIONER

## 2023-09-18 RX ORDER — ERGOCALCIFEROL 1.25 MG/1
50000 CAPSULE ORAL WEEKLY
Qty: 12 CAPSULE | Refills: 0 | Status: SHIPPED | OUTPATIENT
Start: 2023-09-18

## 2023-09-18 NOTE — PROGRESS NOTES
[address] [facility]  [address] [facility], [address] [facility], [address]  Dept: [phone]  Dept Fax: [phone]    Date of Service:  9/18/2023    aJn Puente is a 41 y.o. female who presents in office today with Self. Chief Complaint   Patient presents with    3 Month Follow-Up     HTN, HLD, Hypothyroid. States is still having numbness and tingling in hands. Noticed thyroid levels are within normal range        Diagnoses / Plan:   1. Primary hypertension  Assessment & Plan:   Well-controlled, continue current medications  Orders:  -     Comprehensive Metabolic Panel; Future  -     Lipid, Fasting; Future  -     Microalbumin, Ur; Future  2. Mixed hyperlipidemia  -     Comprehensive Metabolic Panel; Future  -     Lipid, Fasting; Future  3. Vitamin D deficiency  -     Vitamin D 25 Hydroxy; Future  -     vitamin D (ERGOCALCIFEROL) 1.25 MG (70298 UT) CAPS capsule; Take 1 capsule by mouth once a week, Disp-12 capsule, R-0Normal  4. Fatigue, unspecified type  -     vitamin D (ERGOCALCIFEROL) 1.25 MG (93999 UT) CAPS capsule; Take 1 capsule by mouth once a week, Disp-12 capsule, R-0Normal  5. Hypothyroidism, unspecified type  Assessment & Plan:   Well-controlled, continue current medications   Lab Results   Component Value Date    TSH 11.80 (H) 07/03/2019    TSHFT4 4.63 09/15/2023     6. Chronic fatigue  -     Comprehensive Metabolic Panel; Future  -     Vitamin D 25 Hydroxy; Future  7. Numbness and tingling in both hands  -     Comprehensive Metabolic Panel; Future     Chronic health conditions appear to be stable at this time. Instructed to complete above ordered labs. Refills sent to pharmacy as requested. Encouraged healthy diet and routine exercise. Instructed to continue current medications. I have recommended that this patient have a influenza, pneumococcal and COVID-19 vaccine. I have discussed the risks and benefits of this examination with her.  The patient verbalizes No

## 2023-09-30 NOTE — ASSESSMENT & PLAN NOTE
Well-controlled, continue current medications   Lab Results   Component Value Date    TSH 11.80 (H) 07/03/2019    TSHFT4 4.63 09/15/2023

## 2023-12-07 DIAGNOSIS — E55.9 VITAMIN D DEFICIENCY: ICD-10-CM

## 2023-12-07 DIAGNOSIS — R53.83 FATIGUE, UNSPECIFIED TYPE: ICD-10-CM

## 2023-12-07 RX ORDER — ERGOCALCIFEROL 1.25 MG/1
50000 CAPSULE ORAL WEEKLY
Qty: 12 CAPSULE | Refills: 0 | OUTPATIENT
Start: 2023-12-07

## 2023-12-07 NOTE — TELEPHONE ENCOUNTER
Shauna Ruelas is calling to request a refill on the following medication(s):  Requested Prescriptions     Pending Prescriptions Disp Refills    vitamin D (ERGOCALCIFEROL) 1.25 MG (83715 UT) CAPS capsule [Pharmacy Med Name: Vitamin D (Ergocalciferol) 1.25 MG (35021 UT) Oral Capsule] 12 capsule 0     Sig: Take 1 capsule by mouth once a week       Last Visit Date (If Applicable):  9/18/2023    Next Visit Date:    3/18/2024

## 2024-01-01 DIAGNOSIS — E03.9 HYPOTHYROIDISM, UNSPECIFIED TYPE: ICD-10-CM

## 2024-01-02 RX ORDER — LEVOTHYROXINE, LIOTHYRONINE 19; 4.5 UG/1; UG/1
30 TABLET ORAL DAILY
Qty: 90 TABLET | Refills: 2 | Status: SHIPPED | OUTPATIENT
Start: 2024-01-02

## 2024-01-02 NOTE — TELEPHONE ENCOUNTER
Shauna Ruelas is calling to request a refill on the following medication(s):  Requested Prescriptions     Pending Prescriptions Disp Refills    NP THYROID 30 MG tablet [Pharmacy Med Name: NP Thyroid 30 MG Oral Tablet] 90 tablet 0     Sig: Take 1 tablet by mouth once daily       Last Visit Date (If Applicable):  9/18/2023    Next Visit Date:    3/18/2024

## 2024-01-24 ENCOUNTER — OFFICE VISIT (OUTPATIENT)
Dept: PRIMARY CARE CLINIC | Age: 44
End: 2024-01-24
Payer: COMMERCIAL

## 2024-01-24 VITALS
OXYGEN SATURATION: 98 % | TEMPERATURE: 98.3 F | WEIGHT: 186 LBS | SYSTOLIC BLOOD PRESSURE: 140 MMHG | BODY MASS INDEX: 36.33 KG/M2 | HEART RATE: 71 BPM | DIASTOLIC BLOOD PRESSURE: 98 MMHG | RESPIRATION RATE: 14 BRPM

## 2024-01-24 DIAGNOSIS — K08.89 TOOTH PAIN: Primary | ICD-10-CM

## 2024-01-24 DIAGNOSIS — R03.0 ELEVATED BLOOD PRESSURE READING: ICD-10-CM

## 2024-01-24 PROBLEM — Z72.0 TOBACCO USE: Status: ACTIVE | Noted: 2024-01-24

## 2024-01-24 PROCEDURE — G8427 DOCREV CUR MEDS BY ELIG CLIN: HCPCS | Performed by: FAMILY MEDICINE

## 2024-01-24 PROCEDURE — 99213 OFFICE O/P EST LOW 20 MIN: CPT | Performed by: FAMILY MEDICINE

## 2024-01-24 PROCEDURE — 4004F PT TOBACCO SCREEN RCVD TLK: CPT | Performed by: FAMILY MEDICINE

## 2024-01-24 PROCEDURE — G8484 FLU IMMUNIZE NO ADMIN: HCPCS | Performed by: FAMILY MEDICINE

## 2024-01-24 PROCEDURE — 3080F DIAST BP >= 90 MM HG: CPT | Performed by: FAMILY MEDICINE

## 2024-01-24 PROCEDURE — 3077F SYST BP >= 140 MM HG: CPT | Performed by: FAMILY MEDICINE

## 2024-01-24 PROCEDURE — G8417 CALC BMI ABV UP PARAM F/U: HCPCS | Performed by: FAMILY MEDICINE

## 2024-01-24 RX ORDER — TRAMADOL HYDROCHLORIDE 50 MG/1
50 TABLET ORAL EVERY 6 HOURS PRN
Qty: 12 TABLET | Refills: 0 | Status: SHIPPED | OUTPATIENT
Start: 2024-01-24 | End: 2024-01-27

## 2024-01-24 RX ORDER — AMOXICILLIN 875 MG/1
875 TABLET, COATED ORAL 2 TIMES DAILY
Qty: 20 TABLET | Refills: 0 | Status: SHIPPED | OUTPATIENT
Start: 2024-01-24 | End: 2024-02-03

## 2024-01-24 NOTE — PATIENT INSTRUCTIONS
Warning:  One or more of the medications that you have been prescribed may cause drowsiness and impair your ability to operate vehicles or machinery.  Do not drive or operate machinery while taking Tramadol.  Do not use in combination with alcohol.

## 2024-01-24 NOTE — PROGRESS NOTES
[facility]             [address] [address]                        Telephone [phone]             Fax [phone]       Shauna Ruelas  :  1980  Age:  43 y.o.   MRN:  [id number]  Date of visit:  2024       Assessment & Plan:    Tooth pain  Amoxicillin and Tramadol were prescribed.  - amoxicillin (AMOXIL) 875 MG tablet; Take 1 tablet by mouth 2 times daily for 10 days  Dispense: 20 tablet; Refill: 0  - traMADol (ULTRAM) 50 MG tablet; Take 1 tablet by mouth every 6 hours as needed for Pain for up to 3 days. Intended supply: 3 days. Take lowest dose possible to manage pain Max Daily Amount: 200 mg  Dispense: 12 tablet; Refill: 0    She was advised to keep the appointment she has scheduled with her dentist.    Printed information regarding Tooth and Gum Pain  was provided to the patient with the after visit summary.   She was advised not to drive or operate machinery while taking Tramadol.  She was also advised not to take this medication in combination with alcohol.      2. Elevated blood pressure reading  Pain vs. other  She was advised to take her medication as prescribed.         She was advised to follow up if symptoms worsen or do not resolve.           Subjective:    Shauna Ruelas is a 43 y.o. female who presents to [facility] today (2024) for evaluation of:  Dental Pain (Tooth pain on lower left side has a dentist appointment )      She states that she began having tooth pain on the left lower side on 2024.   She has an appointment schedule with her dentist, but she was unable to get an appointment until next week.  She denies fever.   She has been able to eat and drink without difficulty.  She has been taking Ibuprofen for the pain, which \"takes the edge off.\"         Current medications are:  Current Outpatient Medications   Medication Sig Dispense Refill    thyroid (NP THYROID) 30 MG tablet

## 2024-02-11 DIAGNOSIS — I10 PRIMARY HYPERTENSION: ICD-10-CM

## 2024-02-12 RX ORDER — LOSARTAN POTASSIUM AND HYDROCHLOROTHIAZIDE 12.5; 5 MG/1; MG/1
1 TABLET ORAL DAILY
Qty: 30 TABLET | Refills: 5 | Status: SHIPPED | OUTPATIENT
Start: 2024-02-12

## 2024-02-12 NOTE — TELEPHONE ENCOUNTER
Shauna Ruelas is requesting a refill on the following medication(s):  Requested Prescriptions     Pending Prescriptions Disp Refills    losartan-hydroCHLOROthiazide (HYZAAR) 50-12.5 MG per tablet [Pharmacy Med Name: Losartan Potassium-HCTZ 50-12.5 MG Oral Tablet] 30 tablet 0     Sig: Take 1 tablet by mouth once daily       Last Visit Date (If Applicable):  9/18/2023    Next Visit Date:    3/18/2024

## 2024-02-15 ENCOUNTER — TELEPHONE (OUTPATIENT)
Dept: FAMILY MEDICINE CLINIC | Age: 44
End: 2024-02-15

## 2024-02-15 NOTE — TELEPHONE ENCOUNTER
Please notify a fax was received from [facility] with concerns for elevated blood pressure.  They are also requesting medical clearance for procedure.  Recommend she move up the scheduled appointment to evaluate the blood pressure prior to needing medical clearance.

## 2024-03-13 LAB
ALBUMIN/GLOBULIN RATIO: 1.9 G/DL
ALBUMIN: 4.6 G/DL (ref 3.5–5)
ALP BLD-CCNC: 31 UNITS/L (ref 38–126)
ALT SERPL-CCNC: 44 UNITS/L (ref 4–35)
ANION GAP SERPL CALCULATED.3IONS-SCNC: 3 MMOL/L (ref 3–11)
AST SERPL-CCNC: 29 UNITS/L (ref 14–36)
BILIRUB SERPL-MCNC: 0.6 MG/DL (ref 0.2–1.3)
BUN BLDV-MCNC: 12 MG/DL (ref 7–17)
CALCIUM SERPL-MCNC: 9.6 MG/DL (ref 8.4–10.2)
CHLORIDE BLD-SCNC: 104 MMOL/L (ref 98–120)
CHOLESTEROL/HDL RATIO: 4 RATIO (ref 0–4.5)
CHOLESTEROL: 197 MG/DL (ref 50–200)
CO2: 29 MMOL/L (ref 22–31)
CREAT SERPL-MCNC: 0.7 MG/DL (ref 0.5–1)
CREATININE, RANDOM URINE: 297.1 MG/DL (ref 20–370)
GFR CALCULATED: > 60
GLOBULIN: 2.5 G/DL
GLUCOSE: 88 MG/DL (ref 65–105)
HDLC SERPL-MCNC: 44 MG/DL (ref 36–68)
LDL CHOLESTEROL CALCULATED: 126.8 MG/DL (ref 0–160)
MICROALBUMIN UR-MCNC: 5.7 MG/DL (ref 0–1.7)
MICROALBUMIN/CREAT UR-RTO: 19.18
POTASSIUM SERPL-SCNC: 4.1 MMOL/L (ref 3.6–5)
SODIUM BLD-SCNC: 136 MMOL/L (ref 135–145)
TOTAL PROTEIN, SERUM: 7.1 G/DL (ref 6.3–8.2)
TRIGL SERPL-MCNC: 131 MG/DL (ref 10–250)
VITAMIN D 25-HYDROXY: 28.6 NG/ML (ref 30–100)
VLDLC SERPL CALC-MCNC: 26 MG/DL (ref 0–50)

## 2024-03-18 ENCOUNTER — OFFICE VISIT (OUTPATIENT)
Dept: FAMILY MEDICINE CLINIC | Age: 44
End: 2024-03-18

## 2024-03-18 VITALS
WEIGHT: 179.4 LBS | BODY MASS INDEX: 35.04 KG/M2 | DIASTOLIC BLOOD PRESSURE: 95 MMHG | HEART RATE: 71 BPM | SYSTOLIC BLOOD PRESSURE: 144 MMHG | OXYGEN SATURATION: 95 %

## 2024-03-18 DIAGNOSIS — E55.9 VITAMIN D DEFICIENCY: ICD-10-CM

## 2024-03-18 DIAGNOSIS — Z12.31 ENCOUNTER FOR SCREENING MAMMOGRAM FOR BREAST CANCER: ICD-10-CM

## 2024-03-18 DIAGNOSIS — E04.1 THYROID NODULE: ICD-10-CM

## 2024-03-18 DIAGNOSIS — E78.2 MIXED HYPERLIPIDEMIA: ICD-10-CM

## 2024-03-18 DIAGNOSIS — K76.0 HEPATIC STEATOSIS: ICD-10-CM

## 2024-03-18 DIAGNOSIS — N76.0 ACUTE VAGINITIS: ICD-10-CM

## 2024-03-18 DIAGNOSIS — E03.9 HYPOTHYROIDISM, UNSPECIFIED TYPE: ICD-10-CM

## 2024-03-18 DIAGNOSIS — R53.83 FATIGUE, UNSPECIFIED TYPE: ICD-10-CM

## 2024-03-18 DIAGNOSIS — Z72.0 TOBACCO USE: ICD-10-CM

## 2024-03-18 DIAGNOSIS — R20.2 NUMBNESS AND TINGLING IN BOTH HANDS: ICD-10-CM

## 2024-03-18 DIAGNOSIS — R20.0 NUMBNESS AND TINGLING IN BOTH HANDS: ICD-10-CM

## 2024-03-18 DIAGNOSIS — Z00.00 ENCOUNTER FOR WELLNESS EXAMINATION IN ADULT: Primary | ICD-10-CM

## 2024-03-18 DIAGNOSIS — I10 PRIMARY HYPERTENSION: ICD-10-CM

## 2024-03-18 DIAGNOSIS — G47.20 SLEEP PATTERN DISTURBANCE: ICD-10-CM

## 2024-03-18 LAB — TSH REFLEX FT4: 3.73 MIU/ML (ref 0.49–4.67)

## 2024-03-18 RX ORDER — LOSARTAN POTASSIUM AND HYDROCHLOROTHIAZIDE 12.5; 1 MG/1; MG/1
1 TABLET ORAL DAILY
Qty: 30 TABLET | Refills: 1 | Status: SHIPPED | OUTPATIENT
Start: 2024-03-18

## 2024-03-18 RX ORDER — ERGOCALCIFEROL 1.25 MG/1
50000 CAPSULE ORAL WEEKLY
Qty: 12 CAPSULE | Refills: 1 | Status: SHIPPED | OUTPATIENT
Start: 2024-03-18

## 2024-03-18 RX ORDER — AMOXICILLIN 500 MG/1
CAPSULE ORAL
COMMUNITY
Start: 2024-03-14

## 2024-03-18 RX ORDER — FLUCONAZOLE 150 MG/1
TABLET ORAL
Qty: 2 TABLET | Refills: 0 | Status: SHIPPED | OUTPATIENT
Start: 2024-03-18 | End: 2024-03-21

## 2024-03-18 ASSESSMENT — ENCOUNTER SYMPTOMS: BLURRED VISION: 0

## 2024-03-18 ASSESSMENT — PATIENT HEALTH QUESTIONNAIRE - PHQ9
8. MOVING OR SPEAKING SO SLOWLY THAT OTHER PEOPLE COULD HAVE NOTICED. OR THE OPPOSITE, BEING SO FIGETY OR RESTLESS THAT YOU HAVE BEEN MOVING AROUND A LOT MORE THAN USUAL: NOT AT ALL
5. POOR APPETITE OR OVEREATING: NOT AT ALL
2. FEELING DOWN, DEPRESSED OR HOPELESS: NOT AT ALL
SUM OF ALL RESPONSES TO PHQ QUESTIONS 1-9: 2
10. IF YOU CHECKED OFF ANY PROBLEMS, HOW DIFFICULT HAVE THESE PROBLEMS MADE IT FOR YOU TO DO YOUR WORK, TAKE CARE OF THINGS AT HOME, OR GET ALONG WITH OTHER PEOPLE: NOT DIFFICULT AT ALL
SUM OF ALL RESPONSES TO PHQ QUESTIONS 1-9: 2
SUM OF ALL RESPONSES TO PHQ QUESTIONS 1-9: 2
SUM OF ALL RESPONSES TO PHQ9 QUESTIONS 1 & 2: 0
6. FEELING BAD ABOUT YOURSELF - OR THAT YOU ARE A FAILURE OR HAVE LET YOURSELF OR YOUR FAMILY DOWN: NOT AT ALL
9. THOUGHTS THAT YOU WOULD BE BETTER OFF DEAD, OR OF HURTING YOURSELF: NOT AT ALL
4. FEELING TIRED OR HAVING LITTLE ENERGY: MORE THAN HALF THE DAYS
7. TROUBLE CONCENTRATING ON THINGS, SUCH AS READING THE NEWSPAPER OR WATCHING TELEVISION: NOT AT ALL
1. LITTLE INTEREST OR PLEASURE IN DOING THINGS: NOT AT ALL
3. TROUBLE FALLING OR STAYING ASLEEP: NOT AT ALL
SUM OF ALL RESPONSES TO PHQ QUESTIONS 1-9: 2

## 2024-03-18 ASSESSMENT — SLEEP AND FATIGUE QUESTIONNAIRES
DO YOU HAVE CONGESTIVE HEART FAILURE (CHF), HYPERTENSION, CORONARY DISEASE, CARDIAC ARRYTHMIAS, DIABETES, OR HAD A STROKE: YES
HOW LIKELY ARE YOU TO NOD OFF OR FALL ASLEEP WHEN YOU ARE A PASSENGER IN A CAR FOR AN HOUR WITHOUT A BREAK: HIGH CHANCE OF DOZING
ESS TOTAL SCORE: 14
DO YOU WAKE UP MORE THAN ONCE A NIGHT TO URINATE: NO
SLEEP APNEA SCREENER SCORE: 5
HOW LIKELY ARE YOU TO NOD OFF OR FALL ASLEEP WHILE SITTING QUIETLY AFTER LUNCH WITHOUT ALCOHOL: SLIGHT CHANCE OF DOZING
HOW LIKELY ARE YOU TO NOD OFF OR FALL ASLEEP WHILE SITTING INACTIVE IN A PUBLIC PLACE: SLIGHT CHANCE OF DOZING
DO YOU WAKE UP TIRED, ARE YOU TIRED DURING THE DAY, OR DO YOU TAKE NAPS: YES
HAVE YOU EVER BEEN TOLD YOU STOP BREATHING OR HOLD YOUR BREATH WHILE SLEEPING: NO
HOW LIKELY ARE YOU TO NOD OFF OR FALL ASLEEP WHILE SITTING AND TALKING TO SOMEONE: WOULD NEVER DOZE
HOW LIKELY ARE YOU TO NOD OFF OR FALL ASLEEP WHILE LYING DOWN TO REST IN THE AFTERNOON WHEN CIRCUMSTANCES PERMIT: HIGH CHANCE OF DOZING
HOW LIKELY ARE YOU TO NOD OFF OR FALL ASLEEP WHILE SITTING AND READING: HIGH CHANCE OF DOZING
HOW LIKELY ARE YOU TO NOD OFF OR FALL ASLEEP IN A CAR, WHILE STOPPED FOR A FEW MINUTES IN TRAFFIC: WOULD NEVER DOZE
HAVE YOU BEEN TOLD YOU SNORE, EVEN INTERMITTENTLY: YES
HOW LIKELY ARE YOU TO NOD OFF OR FALL ASLEEP WHILE WATCHING TV: HIGH CHANCE OF DOZING

## 2024-03-18 NOTE — PROGRESS NOTES
[address], [address]  [place], [place] [address]  Dept: [phone]  Dept Fax: [phone]    Date of Service:  3/18/2024  Shauna Ruelas   1980     Chief Complaint   Patient presents with    6 Month Follow-Up     Hypothyroid, HLD, HTN    Pre-op Exam     Is scheduled to have tooth extraction and needs clearance due to elevated bp readings    Vaginitis     Since taking abx has developed a yeast infection        Diagnoses / Plan:   1. Encounter for wellness examination in adult  -     vitamin D (ERGOCALCIFEROL) 1.25 MG (40953 UT) CAPS capsule; Take 1 capsule by mouth once a week, Disp-12 capsule, R-1Normal  -     YAIMA CHARIS DIGITAL SCREEN BILATERAL; Future  -     TSH with Reflex to FT4  2. Primary hypertension  -     losartan-hydroCHLOROthiazide (HYZAAR) 100-12.5 MG per tablet; Take 1 tablet by mouth daily, Disp-30 tablet, R-1Normal  3. Mixed hyperlipidemia  4. Hypothyroidism, unspecified type  -     TSH with Reflex to FT4  5. Thyroid nodule  -     TSH with Reflex to FT4  6. Vitamin D deficiency  -     vitamin D (ERGOCALCIFEROL) 1.25 MG (75610 UT) CAPS capsule; Take 1 capsule by mouth once a week, Disp-12 capsule, R-1Normal  7. Numbness and tingling in both hands  Comments:  positional  8. Hepatic steatosis  9. Fatigue, unspecified type  -     Home Sleep Study; Future  10. Tobacco use  Comments:  Smoking cessation discussed and encouraged.  11. Sleep pattern disturbance  -     Home Sleep Study; Future  12. Acute vaginitis  -     fluconazole (DIFLUCAN) 150 MG tablet; Take one po.  May repeat in 3 days prn., Disp-2 tablet, R-0Normal  13. Encounter for screening mammogram for breast cancer  -     YAIMA CHARIS DIGITAL SCREEN BILATERAL; Future     Increase losartan to 100 mg (with HCTZ) for better control of blood pressure.     Patient is doing well. Relevant lab results discussed.  Encouraged following a healthy diet containing lean meats, fruits, and plenty of

## 2024-03-20 NOTE — RESULT ENCOUNTER NOTE
Your recent lab results are normal. Please call the office or message through [employer] with any additional questions or concerns.

## 2024-03-24 PROBLEM — G47.20 SLEEP PATTERN DISTURBANCE: Status: ACTIVE | Noted: 2024-03-24

## 2024-04-04 ENCOUNTER — OFFICE VISIT (OUTPATIENT)
Dept: FAMILY MEDICINE CLINIC | Age: 44
End: 2024-04-04
Payer: COMMERCIAL

## 2024-04-04 VITALS
OXYGEN SATURATION: 97 % | HEART RATE: 66 BPM | BODY MASS INDEX: 35.9 KG/M2 | SYSTOLIC BLOOD PRESSURE: 134 MMHG | DIASTOLIC BLOOD PRESSURE: 84 MMHG | WEIGHT: 183.8 LBS

## 2024-04-04 DIAGNOSIS — I10 PRIMARY HYPERTENSION: Primary | ICD-10-CM

## 2024-04-04 PROCEDURE — 3075F SYST BP GE 130 - 139MM HG: CPT | Performed by: NURSE PRACTITIONER

## 2024-04-04 PROCEDURE — G8427 DOCREV CUR MEDS BY ELIG CLIN: HCPCS | Performed by: NURSE PRACTITIONER

## 2024-04-04 PROCEDURE — G8417 CALC BMI ABV UP PARAM F/U: HCPCS | Performed by: NURSE PRACTITIONER

## 2024-04-04 PROCEDURE — 4004F PT TOBACCO SCREEN RCVD TLK: CPT | Performed by: NURSE PRACTITIONER

## 2024-04-04 PROCEDURE — 3079F DIAST BP 80-89 MM HG: CPT | Performed by: NURSE PRACTITIONER

## 2024-04-04 PROCEDURE — 99213 OFFICE O/P EST LOW 20 MIN: CPT | Performed by: NURSE PRACTITIONER

## 2024-04-04 ASSESSMENT — ENCOUNTER SYMPTOMS
WHEEZING: 0
SHORTNESS OF BREATH: 0
COUGH: 0

## 2024-04-04 NOTE — PROGRESS NOTES
[facility]  [address] [place] [address], [address]  [place] [id number]  Dept: [phone]  Dept Fax: [phone]    Date of Service:  4/4/2024    Shauna Ruelas is a 43 y.o. female who comes in today for follow up of chronic health issues.     Chief Complaint   Patient presents with    Hypertension     2 wk f/u, reports checking bp couple times         Diagnoses / Plan:   1. Primary hypertension     Blood pressure well controlled.  Non pharmacological interventions such as following a low-salt diet and increase water discussed. Instructed to monitor blood pressure at home and keep a log to review during next follow up appointment.     Educated on stress reduction and physical activity.  Patient verbalizes understanding regarding plan of care and all questions answered.     Return in about 3 months (around 7/4/2024) for HTN.     Subjective:   History of Present Illness:  Follow of of hypertension. Medication increased (losartan to 100 mg with HCTZ) 2 weeks ago. She is monitoring the blood pressure at home and doing well. No known side effects. Watching salt and drinking pkenty of water.     BP Readings from Last 3 Encounters:   04/04/24 134/84   03/18/24 (!) 144/95   01/24/24 (!) 140/98       Pulse Readings from Last 3 Encounters:   04/04/24 66   03/18/24 71   01/24/24 71        Wt Readings from Last 3 Encounters:   04/04/24 83.4 kg (183 lb 12.8 oz)   03/18/24 81.4 kg (179 lb 6.4 oz)   01/24/24 84.4 kg (186 lb)        Current Outpatient Medications   Medication Sig Dispense Refill    amoxicillin (AMOXIL) 500 MG capsule TAKE 1 CAPSULE BY MOUTH EVERY 8 HOURS UNTIL GONE      Lactobacillus (PROBIOTIC ACIDOPHILUS PO) Take by mouth      vitamin D (ERGOCALCIFEROL) 1.25 MG (61422 UT) CAPS capsule Take 1 capsule by mouth once a week 12 capsule 1    losartan-hydroCHLOROthiazide (HYZAAR) 100-12.5 MG per tablet Take 1 tablet by mouth daily 30 tablet 1    thyroid (NP THYROID) 30 MG tablet Take 1 tablet

## 2024-05-13 DIAGNOSIS — G47.20 SLEEP PATTERN DISTURBANCE: ICD-10-CM

## 2024-05-13 DIAGNOSIS — R53.83 FATIGUE, UNSPECIFIED TYPE: ICD-10-CM

## 2024-05-14 DIAGNOSIS — I10 PRIMARY HYPERTENSION: ICD-10-CM

## 2024-05-14 NOTE — TELEPHONE ENCOUNTER
Shauna Ruelas is requesting a refill on the following medication(s):  Requested Prescriptions     Pending Prescriptions Disp Refills    losartan-hydroCHLOROthiazide (HYZAAR) 100-12.5 MG per tablet [Pharmacy Med Name: Losartan Potassium-HCTZ 100-12.5 MG Oral Tablet] 30 tablet 2     Sig: Take 1 tablet by mouth once daily       Last Visit Date (If Applicable):  4/4/2024    Next Visit Date:    7/8/2024

## 2024-05-15 RX ORDER — LOSARTAN POTASSIUM AND HYDROCHLOROTHIAZIDE 12.5; 1 MG/1; MG/1
1 TABLET ORAL DAILY
Qty: 30 TABLET | Refills: 5 | Status: SHIPPED | OUTPATIENT
Start: 2024-05-15

## 2024-07-08 ENCOUNTER — OFFICE VISIT (OUTPATIENT)
Dept: FAMILY MEDICINE CLINIC | Age: 44
End: 2024-07-08
Payer: COMMERCIAL

## 2024-07-08 VITALS
WEIGHT: 178.4 LBS | BODY MASS INDEX: 31.61 KG/M2 | OXYGEN SATURATION: 98 % | DIASTOLIC BLOOD PRESSURE: 100 MMHG | SYSTOLIC BLOOD PRESSURE: 152 MMHG | HEART RATE: 81 BPM | HEIGHT: 63 IN

## 2024-07-08 DIAGNOSIS — R39.15 URGENCY OF URINATION: ICD-10-CM

## 2024-07-08 DIAGNOSIS — I10 PRIMARY HYPERTENSION: Primary | ICD-10-CM

## 2024-07-08 DIAGNOSIS — R53.82 CHRONIC FATIGUE: ICD-10-CM

## 2024-07-08 DIAGNOSIS — E03.9 HYPOTHYROIDISM, UNSPECIFIED TYPE: ICD-10-CM

## 2024-07-08 DIAGNOSIS — R80.9 PROTEINURIA, UNSPECIFIED TYPE: ICD-10-CM

## 2024-07-08 DIAGNOSIS — E55.9 VITAMIN D DEFICIENCY: ICD-10-CM

## 2024-07-08 DIAGNOSIS — E78.2 MIXED HYPERLIPIDEMIA: ICD-10-CM

## 2024-07-08 DIAGNOSIS — E04.1 THYROID NODULE: ICD-10-CM

## 2024-07-08 DIAGNOSIS — R82.90 ABNORMAL URINE FINDING: ICD-10-CM

## 2024-07-08 DIAGNOSIS — N30.01 ACUTE CYSTITIS WITH HEMATURIA: ICD-10-CM

## 2024-07-08 DIAGNOSIS — F41.8 MIXED ANXIETY AND DEPRESSIVE DISORDER: ICD-10-CM

## 2024-07-08 LAB
BILIRUBIN, POC: NEGATIVE
BLOOD URINE, POC: NORMAL
CLARITY, POC: CLEAR
COLOR, POC: YELLOW
CREATININE, RANDOM URINE: 20.9 MG/DL (ref 20–370)
GLUCOSE URINE, POC: NEGATIVE
KETONES, POC: NEGATIVE
LEUKOCYTE EST, POC: NORMAL
MICROALBUMIN/CREAT 24H UR: 1.5 MG/DL (ref 0–1.7)
MICROALBUMIN/CREAT UR-RTO: 71.77
NITRITE, POC: NEGATIVE
PH, POC: 8
PROTEIN, POC: NEGATIVE
SPECIFIC GRAVITY, POC: 1.01
UROBILINOGEN, POC: 0.2
VITAMIN D 25-HYDROXY: 56.3 NG/ML (ref 30–100)

## 2024-07-08 PROCEDURE — 3077F SYST BP >= 140 MM HG: CPT | Performed by: NURSE PRACTITIONER

## 2024-07-08 PROCEDURE — 3080F DIAST BP >= 90 MM HG: CPT | Performed by: NURSE PRACTITIONER

## 2024-07-08 PROCEDURE — 81002 URINALYSIS NONAUTO W/O SCOPE: CPT | Performed by: NURSE PRACTITIONER

## 2024-07-08 PROCEDURE — G8427 DOCREV CUR MEDS BY ELIG CLIN: HCPCS | Performed by: NURSE PRACTITIONER

## 2024-07-08 PROCEDURE — 99214 OFFICE O/P EST MOD 30 MIN: CPT | Performed by: NURSE PRACTITIONER

## 2024-07-08 PROCEDURE — 4004F PT TOBACCO SCREEN RCVD TLK: CPT | Performed by: NURSE PRACTITIONER

## 2024-07-08 PROCEDURE — G8417 CALC BMI ABV UP PARAM F/U: HCPCS | Performed by: NURSE PRACTITIONER

## 2024-07-08 RX ORDER — AMLODIPINE BESYLATE 5 MG/1
5 TABLET ORAL DAILY
Qty: 30 TABLET | Refills: 1 | Status: SHIPPED | OUTPATIENT
Start: 2024-07-08

## 2024-07-08 RX ORDER — PAROXETINE 10 MG/1
10 TABLET, FILM COATED ORAL DAILY
Qty: 30 TABLET | Refills: 1 | Status: SHIPPED | OUTPATIENT
Start: 2024-07-08

## 2024-07-08 RX ORDER — CEPHALEXIN 500 MG/1
500 CAPSULE ORAL 2 TIMES DAILY
Qty: 14 CAPSULE | Refills: 0 | Status: SHIPPED | OUTPATIENT
Start: 2024-07-08 | End: 2024-07-15

## 2024-07-08 SDOH — ECONOMIC STABILITY: INCOME INSECURITY: HOW HARD IS IT FOR YOU TO PAY FOR THE VERY BASICS LIKE FOOD, HOUSING, MEDICAL CARE, AND HEATING?: NOT HARD AT ALL

## 2024-07-08 SDOH — ECONOMIC STABILITY: FOOD INSECURITY: WITHIN THE PAST 12 MONTHS, YOU WORRIED THAT YOUR FOOD WOULD RUN OUT BEFORE YOU GOT MONEY TO BUY MORE.: NEVER TRUE

## 2024-07-08 SDOH — ECONOMIC STABILITY: FOOD INSECURITY: WITHIN THE PAST 12 MONTHS, THE FOOD YOU BOUGHT JUST DIDN'T LAST AND YOU DIDN'T HAVE MONEY TO GET MORE.: NEVER TRUE

## 2024-07-08 ASSESSMENT — ENCOUNTER SYMPTOMS
WHEEZING: 0
ABDOMINAL PAIN: 0
EYES NEGATIVE: 1
NAUSEA: 0
DIARRHEA: 0
CONSTIPATION: 0
SHORTNESS OF BREATH: 0
TROUBLE SWALLOWING: 0
COUGH: 0

## 2024-07-08 NOTE — PROGRESS NOTES
[address], [address]  [place] [address]  Dept: [phone]  Dept Fax: [phone]    Date of Service:  7/8/2024    Chief Complaint   Patient presents with    3 Month Follow-Up        Diagnoses / Plan:   1. Primary hypertension  -     amLODIPine (NORVASC) 5 MG tablet; Take 1 tablet by mouth daily, Disp-30 tablet, R-1Normal  -     Microalbumin Panel  2. Proteinuria, unspecified type  -     Microalbumin Panel  3. Vitamin D deficiency  -     Vitamin D 25 Hydroxy  4. Thyroid nodule  Comments:  Thyroid US ordered  5. Chronic fatigue  6. Hypothyroidism, unspecified type  Assessment & Plan:   Well-controlled, continue current medications  7. Mixed hyperlipidemia  8. Mixed anxiety and depressive disorder  Assessment & Plan:  Having increased stress, but reports doing okay with current medication.   Orders:  -     PARoxetine (PAXIL) 10 MG tablet; Take 1 tablet by mouth daily, Disp-30 tablet, R-1Normal  9. Urgency of urination  -     POCT Urinalysis no Micro  10. Abnormal urine finding  -     POCT Urinalysis no Micro  11. Acute cystitis with hematuria  -     cephALEXin (KEFLEX) 500 MG capsule; Take 1 capsule by mouth 2 times daily for 7 days, Disp-14 capsule, R-0Normal  -     Culture, Urine     Results for POC orders placed in visit on 07/08/24   POCT Urinalysis no Micro   Result Value Ref Range    Color, UA yellow     Clarity, UA clear     Glucose, UA POC negative     Bilirubin, UA negative     Ketones, UA negative     Spec Grav, UA 1.010     Blood, UA POC trace     pH, UA 8     Protein, UA POC negative     Urobilinogen, UA 0.2     Leukocytes, UA moderate     Nitrite, UA negative      Start amlodipine for better control of blood pressure. Discussed both the desired effects and potential side effects of the medication. Additionally, non-pharmacological approaches such as adhering to a low-salt diet and increasing water intake were reviewed. Instructed to monitor

## 2024-07-10 ENCOUNTER — OFFICE VISIT (OUTPATIENT)
Dept: FAMILY MEDICINE CLINIC | Age: 44
End: 2024-07-10
Payer: COMMERCIAL

## 2024-07-10 VITALS
DIASTOLIC BLOOD PRESSURE: 90 MMHG | TEMPERATURE: 98.3 F | HEART RATE: 77 BPM | BODY MASS INDEX: 32.57 KG/M2 | SYSTOLIC BLOOD PRESSURE: 140 MMHG | WEIGHT: 177 LBS | HEIGHT: 62 IN | OXYGEN SATURATION: 97 %

## 2024-07-10 DIAGNOSIS — J06.9 VIRAL URI: Primary | ICD-10-CM

## 2024-07-10 DIAGNOSIS — J02.9 SORE THROAT: ICD-10-CM

## 2024-07-10 DIAGNOSIS — R05.9 COUGH, UNSPECIFIED TYPE: ICD-10-CM

## 2024-07-10 LAB
INFLUENZA A ANTIGEN, POC: NEGATIVE
INFLUENZA B ANTIGEN, POC: NEGATIVE
LOT EXPIRE DATE: NORMAL
LOT KIT NUMBER: NORMAL
S PYO AG THROAT QL: NORMAL
SARS-COV-2, POC: NORMAL
VALID INTERNAL CONTROL: NORMAL
VENDOR AND KIT NAME POC: NORMAL

## 2024-07-10 PROCEDURE — 3080F DIAST BP >= 90 MM HG: CPT | Performed by: NURSE PRACTITIONER

## 2024-07-10 PROCEDURE — 99213 OFFICE O/P EST LOW 20 MIN: CPT | Performed by: NURSE PRACTITIONER

## 2024-07-10 PROCEDURE — 87880 STREP A ASSAY W/OPTIC: CPT | Performed by: NURSE PRACTITIONER

## 2024-07-10 PROCEDURE — G8417 CALC BMI ABV UP PARAM F/U: HCPCS | Performed by: NURSE PRACTITIONER

## 2024-07-10 PROCEDURE — 4004F PT TOBACCO SCREEN RCVD TLK: CPT | Performed by: NURSE PRACTITIONER

## 2024-07-10 PROCEDURE — 3077F SYST BP >= 140 MM HG: CPT | Performed by: NURSE PRACTITIONER

## 2024-07-10 PROCEDURE — 87428 SARSCOV & INF VIR A&B AG IA: CPT | Performed by: NURSE PRACTITIONER

## 2024-07-10 PROCEDURE — G8427 DOCREV CUR MEDS BY ELIG CLIN: HCPCS | Performed by: NURSE PRACTITIONER

## 2024-07-10 ASSESSMENT — ENCOUNTER SYMPTOMS
DIARRHEA: 0
WHEEZING: 0
NAUSEA: 0
SORE THROAT: 1
VOMITING: 0
COUGH: 1
RHINORRHEA: 1

## 2024-07-10 NOTE — PROGRESS NOTES
Shaye Soto, APRN-CNP  [place] [id number]  [phone]        7/10/2024     Shauna Ruelas is a 44 y.o. female, here for evaluation of the following medical concerns:    Chief Complaint   Patient presents with    Pharyngitis     Pt is here for sore throat,coughing up mucus, body aches. Pt states her fever was 100 at the highest. Pt was at the doctors Monday for UTI, she is on keflex. Pt states that her symptoms started on Monday.         URI   This is a new problem. The current episode started in the past 7 days (2 days ago on Monday after going to the doctor.). Maximum temperature: 100.0. Associated symptoms include coughing, rhinorrhea and a sore throat. Pertinent negatives include no congestion, diarrhea, headaches, nausea, vomiting or wheezing.       Patient Active Problem List   Diagnosis    Hepatic steatosis    Primary hypertension    Mixed hyperlipidemia    Fatigue    Hypothyroidism    Vitamin D deficiency    Ganglion cyst of volar aspect of right wrist    Numbness and tingling in both hands    Thyroid nodule    Tobacco use    Sleep pattern disturbance    Proteinuria       Patient's recent lab reports are as follows:      Results for orders placed or performed in visit on 07/10/24   POCT COVID-19 & Influenza A/B   Result Value Ref Range    Internal Control n/a     Lot/Kit Number [id number]     Lot/Kit  date: 2025     SARS-COV-2, POC Not-Detected Not Detected    Influenza A Antigen, POC Negative     Influenza B Antigen, POC Negative     Vendor and kit name Veritor    POCT rapid strep A   Result Value Ref Range    Strep A Ag None Detected None Detected     Other review of systems are as noted below.    Preventative measures are reviewed today. See health maintenance section for complete preventative plan of care.    Did review patient's med list, allergies, social history, fam history, pmhx and pshx today as noted in the record.      Review of

## 2024-07-14 PROBLEM — F41.8 MIXED ANXIETY AND DEPRESSIVE DISORDER: Status: ACTIVE | Noted: 2024-07-14

## 2024-07-23 DIAGNOSIS — E04.2 MULTINODULAR GOITER: Primary | ICD-10-CM

## 2024-08-05 ENCOUNTER — OFFICE VISIT (OUTPATIENT)
Dept: FAMILY MEDICINE CLINIC | Age: 44
End: 2024-08-05

## 2024-08-05 VITALS
BODY MASS INDEX: 32.37 KG/M2 | DIASTOLIC BLOOD PRESSURE: 84 MMHG | OXYGEN SATURATION: 97 % | WEIGHT: 177 LBS | HEART RATE: 83 BPM | SYSTOLIC BLOOD PRESSURE: 138 MMHG

## 2024-08-05 DIAGNOSIS — I10 PRIMARY HYPERTENSION: ICD-10-CM

## 2024-08-05 DIAGNOSIS — K52.9 ACUTE GASTROENTERITIS: Primary | ICD-10-CM

## 2024-08-05 DIAGNOSIS — R19.7 DIARRHEA, UNSPECIFIED TYPE: ICD-10-CM

## 2024-08-05 NOTE — PROGRESS NOTES
[address], [address]  [place], [place] [address]  Dept: [phone]  Dept Fax: [phone]    Date of Service:  8/5/2024    Chief Complaint   Patient presents with    Follow-up     4 week f/u, check BP Meds        Diagnoses / Plan:   1. Acute gastroenteritis  Comments:  Recommend staying hydrated and using Imodium as needed for loose stools. If symptoms persist or worsen, consider further evaluation.  2. Primary hypertension  Assessment & Plan:  Improving, continue current medications. Monitor blood pressure at home and report any significant changes.  3. Diarrhea, unspecified type     Patient verbalizes understanding regarding plan of care and all questions answered.    Return if symptoms worsen or fail to improve.     Subjective:   History of Present Illness:  Follow up of hypertension after starting amlodipine. Patient reports blood pressure readings at home have improved, with the most recent reading being 138/84 mmHg. The patient denies feeling any different with the improved blood pressure.     Complains of gastrointestinal symptoms, including increased frequency of bowel movements, with loose stools occurring approximately five times a day for the past week. The patient notes symptoms began after a recent illness, which included a persistent cough and mucus production. Denies nausea, blood in the stool, or urinary symptoms. The patient reports occasional abdominal cramps that are relieved by bowel movements. They have been staying hydrated and have tried Pepto Bismol without relief.     BP Readings from Last 3 Encounters:   08/05/24 138/84   07/10/24 (!) 140/90   07/08/24 (!) 152/100       Pulse Readings from Last 3 Encounters:   08/05/24 83   07/10/24 77   07/08/24 81        Wt Readings from Last 3 Encounters:   08/05/24 80.3 kg (177 lb)   07/10/24 80.3 kg (177 lb)   07/08/24 80.9 kg (178 lb 6.4 oz)        Current Outpatient Medications   Medication Sig

## 2024-12-31 DIAGNOSIS — F41.8 MIXED ANXIETY AND DEPRESSIVE DISORDER: ICD-10-CM

## 2024-12-31 DIAGNOSIS — I10 PRIMARY HYPERTENSION: ICD-10-CM

## 2024-12-31 RX ORDER — PAROXETINE 10 MG/1
10 TABLET, FILM COATED ORAL DAILY
Qty: 30 TABLET | Refills: 5 | Status: SHIPPED | OUTPATIENT
Start: 2024-12-31

## 2024-12-31 RX ORDER — AMLODIPINE BESYLATE 5 MG/1
5 TABLET ORAL DAILY
Qty: 30 TABLET | Refills: 5 | Status: SHIPPED | OUTPATIENT
Start: 2024-12-31

## 2024-12-31 NOTE — TELEPHONE ENCOUNTER
Shauna Ruelas is calling to request a refill on the following medication(s):  Requested Prescriptions     Pending Prescriptions Disp Refills    amLODIPine (NORVASC) 5 MG tablet [Pharmacy Med Name: amLODIPine Besylate 5 MG Oral Tablet] 30 tablet 5     Sig: Take 1 tablet by mouth once daily    PARoxetine (PAXIL) 10 MG tablet [Pharmacy Med Name: PARoxetine HCl 10 MG Oral Tablet] 30 tablet 5     Sig: Take 1 tablet by mouth once daily       Last Visit Date (If Applicable):  8/5/2024    Next Visit Date:    Visit date not found

## 2025-01-25 DIAGNOSIS — Z00.00 ENCOUNTER FOR WELLNESS EXAMINATION IN ADULT: ICD-10-CM

## 2025-01-25 DIAGNOSIS — E55.9 VITAMIN D DEFICIENCY: ICD-10-CM

## 2025-01-27 DIAGNOSIS — E55.9 VITAMIN D DEFICIENCY: Primary | ICD-10-CM

## 2025-01-27 RX ORDER — ERGOCALCIFEROL 1.25 MG/1
50000 CAPSULE, LIQUID FILLED ORAL WEEKLY
Qty: 12 CAPSULE | Refills: 0 | OUTPATIENT
Start: 2025-01-27

## 2025-01-27 NOTE — TELEPHONE ENCOUNTER
Shauna Ruelas is calling to request a refill on the following medication(s):  Requested Prescriptions     Pending Prescriptions Disp Refills    vitamin D (ERGOCALCIFEROL) 1.25 MG (38381 UT) CAPS capsule [Pharmacy Med Name: Vitamin D (Ergocalciferol) 1.25 MG (95355 UT) Oral Capsule] 12 capsule 0     Sig: Take 1 capsule by mouth once a week       Last Visit Date (If Applicable):  8/5/2024    Next Visit Date:    Visit date not found

## 2025-02-14 DIAGNOSIS — E55.9 VITAMIN D DEFICIENCY: ICD-10-CM

## 2025-02-14 DIAGNOSIS — E55.9 VITAMIN D DEFICIENCY: Primary | ICD-10-CM

## 2025-02-14 DIAGNOSIS — Z00.00 ENCOUNTER FOR WELLNESS EXAMINATION IN ADULT: ICD-10-CM

## 2025-02-14 RX ORDER — ERGOCALCIFEROL 1.25 MG/1
50000 CAPSULE, LIQUID FILLED ORAL WEEKLY
Qty: 12 CAPSULE | Refills: 0 | OUTPATIENT
Start: 2025-02-14

## 2025-02-14 NOTE — TELEPHONE ENCOUNTER
Shauna Ruelas is requesting a refill on the following medication(s):  Requested Prescriptions     Pending Prescriptions Disp Refills    vitamin D (ERGOCALCIFEROL) 1.25 MG (84687 UT) CAPS capsule [Pharmacy Med Name: Vitamin D (Ergocalciferol) 1.25 MG (39731 UT) Oral Capsule] 12 capsule 0     Sig: Take 1 capsule by mouth once a week       Last Visit Date (If Applicable):  8/5/2024    Next Visit Date:    Visit date not found

## 2025-04-01 DIAGNOSIS — I10 PRIMARY HYPERTENSION: ICD-10-CM

## 2025-04-01 RX ORDER — LOSARTAN POTASSIUM AND HYDROCHLOROTHIAZIDE 12.5; 1 MG/1; MG/1
1 TABLET ORAL DAILY
Qty: 30 TABLET | Refills: 1 | Status: SHIPPED | OUTPATIENT
Start: 2025-04-01

## 2025-04-01 NOTE — TELEPHONE ENCOUNTER
Shauna Ruelas is calling to request a refill on the following medication(s):  Requested Prescriptions     Pending Prescriptions Disp Refills    losartan-hydroCHLOROthiazide (HYZAAR) 100-12.5 MG per tablet [Pharmacy Med Name: Losartan Potassium-HCTZ 100-12.5 MG Oral Tablet] 30 tablet 3     Sig: Take 1 tablet by mouth once daily       Last Visit Date (If Applicable):  8/5/2024    Next Visit Date:    Visit date not found

## 2025-06-05 DIAGNOSIS — I10 PRIMARY HYPERTENSION: ICD-10-CM

## 2025-06-06 RX ORDER — LOSARTAN POTASSIUM AND HYDROCHLOROTHIAZIDE 12.5; 1 MG/1; MG/1
1 TABLET ORAL DAILY
Qty: 30 TABLET | Refills: 0 | Status: SHIPPED | OUTPATIENT
Start: 2025-06-06 | End: 2025-07-08

## 2025-06-30 ENCOUNTER — OFFICE VISIT (OUTPATIENT)
Dept: FAMILY MEDICINE CLINIC | Age: 45
End: 2025-06-30
Payer: COMMERCIAL

## 2025-06-30 VITALS
OXYGEN SATURATION: 97 % | BODY MASS INDEX: 34.02 KG/M2 | WEIGHT: 186 LBS | SYSTOLIC BLOOD PRESSURE: 122 MMHG | HEART RATE: 87 BPM | DIASTOLIC BLOOD PRESSURE: 80 MMHG

## 2025-06-30 DIAGNOSIS — E03.9 HYPOTHYROIDISM, UNSPECIFIED TYPE: ICD-10-CM

## 2025-06-30 DIAGNOSIS — R25.1 TREMOR OF HANDS AND FACE: ICD-10-CM

## 2025-06-30 DIAGNOSIS — Z00.00 ENCOUNTER FOR WELLNESS EXAMINATION IN ADULT: Primary | ICD-10-CM

## 2025-06-30 DIAGNOSIS — I10 PRIMARY HYPERTENSION: ICD-10-CM

## 2025-06-30 DIAGNOSIS — E55.9 VITAMIN D DEFICIENCY: ICD-10-CM

## 2025-06-30 DIAGNOSIS — Z72.0 TOBACCO USE: ICD-10-CM

## 2025-06-30 DIAGNOSIS — E78.2 MIXED HYPERLIPIDEMIA: ICD-10-CM

## 2025-06-30 DIAGNOSIS — M65.4 DE QUERVAIN'S TENOSYNOVITIS, RIGHT: ICD-10-CM

## 2025-06-30 PROCEDURE — 3079F DIAST BP 80-89 MM HG: CPT | Performed by: NURSE PRACTITIONER

## 2025-06-30 PROCEDURE — 99396 PREV VISIT EST AGE 40-64: CPT | Performed by: NURSE PRACTITIONER

## 2025-06-30 PROCEDURE — 3074F SYST BP LT 130 MM HG: CPT | Performed by: NURSE PRACTITIONER

## 2025-06-30 RX ORDER — CHOLECALCIFEROL (VITAMIN D3) 1250 MCG
1 CAPSULE ORAL WEEKLY
COMMUNITY
Start: 2025-06-21

## 2025-06-30 RX ORDER — PREDNISONE 20 MG/1
20 TABLET ORAL DAILY
Qty: 5 TABLET | Refills: 0 | Status: SHIPPED | OUTPATIENT
Start: 2025-06-30 | End: 2025-07-05

## 2025-06-30 RX ORDER — THYROID 30 MG/1
180 TABLET ORAL WEEKLY
COMMUNITY
End: 2025-06-30

## 2025-06-30 RX ORDER — THYROID 120 MG/1
120 TABLET ORAL DAILY
COMMUNITY
Start: 2025-06-23

## 2025-06-30 SDOH — ECONOMIC STABILITY: FOOD INSECURITY: WITHIN THE PAST 12 MONTHS, YOU WORRIED THAT YOUR FOOD WOULD RUN OUT BEFORE YOU GOT MONEY TO BUY MORE.: NEVER TRUE

## 2025-06-30 SDOH — ECONOMIC STABILITY: FOOD INSECURITY: WITHIN THE PAST 12 MONTHS, THE FOOD YOU BOUGHT JUST DIDN'T LAST AND YOU DIDN'T HAVE MONEY TO GET MORE.: NEVER TRUE

## 2025-06-30 ASSESSMENT — PATIENT HEALTH QUESTIONNAIRE - PHQ9
SUM OF ALL RESPONSES TO PHQ QUESTIONS 1-9: 1
4. FEELING TIRED OR HAVING LITTLE ENERGY: NOT AT ALL
1. LITTLE INTEREST OR PLEASURE IN DOING THINGS: NOT AT ALL
6. FEELING BAD ABOUT YOURSELF - OR THAT YOU ARE A FAILURE OR HAVE LET YOURSELF OR YOUR FAMILY DOWN: NOT AT ALL
3. TROUBLE FALLING OR STAYING ASLEEP: SEVERAL DAYS
SUM OF ALL RESPONSES TO PHQ QUESTIONS 1-9: 1
8. MOVING OR SPEAKING SO SLOWLY THAT OTHER PEOPLE COULD HAVE NOTICED. OR THE OPPOSITE, BEING SO FIGETY OR RESTLESS THAT YOU HAVE BEEN MOVING AROUND A LOT MORE THAN USUAL: NOT AT ALL
SUM OF ALL RESPONSES TO PHQ QUESTIONS 1-9: 1
SUM OF ALL RESPONSES TO PHQ QUESTIONS 1-9: 1
9. THOUGHTS THAT YOU WOULD BE BETTER OFF DEAD, OR OF HURTING YOURSELF: NOT AT ALL
5. POOR APPETITE OR OVEREATING: NOT AT ALL
2. FEELING DOWN, DEPRESSED OR HOPELESS: NOT AT ALL
7. TROUBLE CONCENTRATING ON THINGS, SUCH AS READING THE NEWSPAPER OR WATCHING TELEVISION: NOT AT ALL
10. IF YOU CHECKED OFF ANY PROBLEMS, HOW DIFFICULT HAVE THESE PROBLEMS MADE IT FOR YOU TO DO YOUR WORK, TAKE CARE OF THINGS AT HOME, OR GET ALONG WITH OTHER PEOPLE: NOT DIFFICULT AT ALL

## 2025-06-30 NOTE — PROGRESS NOTES
exams and report any abnormalities  - Mammogram ordered due to dense breast tissue    Home Care Instructions: Advised patient that mild spotting may occur and to monitor for any unusual symptoms.     Breast Exam  - Patient positioned comfortably, draped appropriately.  - Tolerance Level: Patient tolerated the procedure well.  - Home Care Instructions: Advised patient to continue monthly self-breast exams and report any changes or concerns.     Encounter for wellness examination in adult  -     PAP SMEAR; Future  -     YAIMA CHARIS DIGITAL SCREEN BILATERAL; Future  De Quervain's tenosynovitis, right  -     predniSONE (DELTASONE) 20 MG tablet; Take 1 tablet by mouth daily for 5 days, Disp-5 tablet, R-0Normal  Tremor of hands and face  Primary hypertension  Assessment & Plan:   Chronic, at goal (stable), continue current treatment plan  Hypothyroidism, unspecified type  Assessment & Plan:   Monitored by specialist- no acute findings meriting change in the plan  Vitamin D deficiency  Mixed hyperlipidemia  Tobacco use     Pap smear with HPV cotesting completed.  Order completed for mammogram. Will notify patient of results once reviewed.  Encouraged monthly self breast exams, healthy diet and routine exercise. Patient verbalizes understanding regarding plan of care and all questions answered.      Subjective:   History of Present Illness:  Patient presents for routine gynecological exam.  She denies having any acute gynecological related issues.  No vaginal discharge or irritation.  No abdominal pain.  No urinary complaints.      History of Present Illness  The patient presents for a Pap smear, evaluation of dense breast tissue, thumb pain, tremors, thyroid disorder, prediabetes, and ear pain.    They are due for a Pap smear, having not undergone the procedure in approximately 3 years. They also require a mammogram, as their last one was in 03/2024. They report having dense breast tissue, which makes it harder to detect

## 2025-07-03 LAB
HPV APTIMA: NEGATIVE
PAP IMAGE GUIDED: NORMAL

## 2025-07-05 DIAGNOSIS — I10 PRIMARY HYPERTENSION: ICD-10-CM

## 2025-07-07 NOTE — TELEPHONE ENCOUNTER
Shauna Ruelas is requesting a refill on the following medication(s):  Requested Prescriptions     Pending Prescriptions Disp Refills    amLODIPine (NORVASC) 5 MG tablet [Pharmacy Med Name: amLODIPine Besylate 5 MG Oral Tablet] 30 tablet 0     Sig: Take 1 tablet by mouth once daily    losartan-hydroCHLOROthiazide (HYZAAR) 100-12.5 MG per tablet [Pharmacy Med Name: Losartan Potassium-HCTZ 100-12.5 MG Oral Tablet] 30 tablet 0     Sig: Take 1 tablet by mouth once daily       Last Visit Date (If Applicable):  6/30/2025    Next Visit Date:    9/29/2025

## 2025-07-08 RX ORDER — LOSARTAN POTASSIUM AND HYDROCHLOROTHIAZIDE 12.5; 1 MG/1; MG/1
1 TABLET ORAL DAILY
Qty: 30 TABLET | Refills: 5 | Status: SHIPPED | OUTPATIENT
Start: 2025-07-08

## 2025-07-08 RX ORDER — AMLODIPINE BESYLATE 5 MG/1
5 TABLET ORAL DAILY
Qty: 30 TABLET | Refills: 5 | Status: SHIPPED | OUTPATIENT
Start: 2025-07-08

## 2025-07-10 DIAGNOSIS — Z12.31 SCREENING MAMMOGRAM FOR BREAST CANCER: Primary | ICD-10-CM
